# Patient Record
Sex: MALE | Race: WHITE | NOT HISPANIC OR LATINO | Employment: OTHER | URBAN - METROPOLITAN AREA
[De-identification: names, ages, dates, MRNs, and addresses within clinical notes are randomized per-mention and may not be internally consistent; named-entity substitution may affect disease eponyms.]

---

## 2017-07-25 ENCOUNTER — TELEPHONE (OUTPATIENT)
Dept: FAMILY MEDICINE | Facility: CLINIC | Age: 82
End: 2017-07-25

## 2017-07-25 NOTE — TELEPHONE ENCOUNTER
Voice mail message regarding follow up appointment for health issues. Also requested that he let us know if he has transferred his care elsewhere.

## 2020-03-03 ENCOUNTER — APPOINTMENT (OUTPATIENT)
Dept: CT IMAGING | Facility: CLINIC | Age: 85
End: 2020-03-03
Attending: EMERGENCY MEDICINE
Payer: COMMERCIAL

## 2020-03-03 ENCOUNTER — HOSPITAL ENCOUNTER (EMERGENCY)
Facility: CLINIC | Age: 85
Discharge: HOME | End: 2020-03-03
Attending: EMERGENCY MEDICINE | Admitting: EMERGENCY MEDICINE
Payer: COMMERCIAL

## 2020-03-03 VITALS
WEIGHT: 170 LBS | HEART RATE: 99 BPM | RESPIRATION RATE: 16 BRPM | SYSTOLIC BLOOD PRESSURE: 156 MMHG | TEMPERATURE: 98 F | BODY MASS INDEX: 31.09 KG/M2 | OXYGEN SATURATION: 96 % | DIASTOLIC BLOOD PRESSURE: 92 MMHG

## 2020-03-03 DIAGNOSIS — S60.417A ABRASION OF LEFT LITTLE FINGER, INITIAL ENCOUNTER: ICD-10-CM

## 2020-03-03 DIAGNOSIS — S09.93XA BLUNT TRAUMA OF FACE, INITIAL ENCOUNTER: ICD-10-CM
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
## 2020-03-03 DIAGNOSIS — S02.2XXA CLOSED FRACTURE OF NASAL BONE, INITIAL ENCOUNTER: ICD-10-CM

## 2020-03-03 DIAGNOSIS — S00.31XA NASAL ABRASION, INITIAL ENCOUNTER: ICD-10-CM

## 2020-03-03 PROCEDURE — 12011 RPR F/E/E/N/L/M 2.5 CM/<: CPT | Mod: 59 | Performed by: EMERGENCY MEDICINE

## 2020-03-03 PROCEDURE — 99284 EMERGENCY DEPT VISIT MOD MDM: CPT | Mod: 25 | Performed by: EMERGENCY MEDICINE

## 2020-03-03 PROCEDURE — 12001 RPR S/N/AX/GEN/TRNK 2.5CM/<: CPT | Mod: 59 | Performed by: EMERGENCY MEDICINE

## 2020-03-03 PROCEDURE — 70486 CT MAXILLOFACIAL W/O DYE: CPT

## 2020-03-03 PROCEDURE — 21310 ZZHC CLOSED TX NASAL BONE FRACTURE W/O MANIPULATION: CPT | Mod: 54 | Performed by: EMERGENCY MEDICINE

## 2020-03-03 PROCEDURE — 99285 EMERGENCY DEPT VISIT HI MDM: CPT | Mod: 25 | Performed by: EMERGENCY MEDICINE

## 2020-03-03 PROCEDURE — 21310 ZZHC CLOSED TX NASAL BONE FRACTURE W/O MANIPULATION: CPT | Performed by: EMERGENCY MEDICINE

## 2020-03-03 PROCEDURE — 70450 CT HEAD/BRAIN W/O DYE: CPT

## 2020-03-03 PROCEDURE — 72125 CT NECK SPINE W/O DYE: CPT

## 2020-03-03 RX ORDER — TAMSULOSIN HYDROCHLORIDE 0.4 MG/1
0.4 CAPSULE ORAL AT BEDTIME
COMMUNITY
End: 2022-01-01

## 2020-03-03 RX ORDER — ACETAMINOPHEN 325 MG/1
650 TABLET ORAL
Status: DISCONTINUED | OUTPATIENT
Start: 2020-03-03 | End: 2020-03-03 | Stop reason: HOSPADM

## 2020-03-03 ASSESSMENT — ENCOUNTER SYMPTOMS
NEUROLOGICAL NEGATIVE: 1
GASTROINTESTINAL NEGATIVE: 1
ENDOCRINE NEGATIVE: 1
CONSTITUTIONAL NEGATIVE: 1
CARDIOVASCULAR NEGATIVE: 1
HEMATOLOGIC/LYMPHATIC NEGATIVE: 1
MUSCULOSKELETAL NEGATIVE: 1
EYES NEGATIVE: 1
ALLERGIC/IMMUNOLOGIC NEGATIVE: 1
RESPIRATORY NEGATIVE: 1
PSYCHIATRIC NEGATIVE: 1

## 2020-03-03 NOTE — ED PROVIDER NOTES
History     Chief Complaint   Patient presents with     Head Injury     stepping off a step and fell. No LOC. No blood thinners. Nose injury, laceration to bilateral hands, and knee injury.      HPI  Anjel Thomas is a 84 year old male presents to the wound with report of facial trauma after fall from standing height.  He was at [employer] earlier today and reports he missed a step when he fell face first. arrived by car with his brother Don reporting some swelling about the nose pain about the midface.  He has no headache.  He reports no neck pain.  He reports no chest pain no abdominal pain no hip pain and no back pain.  Patient is currently on lisinopril reports he does not take any blood thinners., injury about an hour prior to arrival in the department.    Allergies:  Allergies   Allergen Reactions     Penicillins Other (See Comments)     Pt does not know.       Problem List:    Patient Active Problem List    Diagnosis Date Noted     Gout 08/12/2013     Priority: Medium     [employer] Update utility       Carpal tunnel syndrome of right wrist 04/12/2013     Priority: Medium     Osteoarthritis 03/09/2013     Priority: Medium     Inflammatory arthritis 03/06/2013     Priority: Medium     CARDIOVASCULAR SCREENING; LDL GOAL LESS THAN 130 02/12/2013     Priority: Medium        Past Medical History:    No past medical history on file.    Past Surgical History:    Past Surgical History:   Procedure Laterality Date     RELEASE CARPAL TUNNEL  5/10/2013    Procedure: RELEASE CARPAL TUNNEL;  Right Carpal Tunnel Release;  Surgeon: Cyril Ugarte MD;  Location: [facility] OR       Family History:    Family History   Problem Relation Age of Onset     Arthritis Mother      Cerebrovascular Disease Father      Respiratory Brother         COPD       Social History:  Marital Status:  Single [1]  Social History     Tobacco Use     Smoking status: Never Smoker     Smokeless tobacco: Never Used   Substance Use Topics     Alcohol use: No     Drug  use: No        Medications:    lisinopril (PRINIVIL,ZESTRIL) 10 MG tablet  Multiple Vitamin (ONE-A-DAY MENS PO)  tamsulosin (FLOMAX) 0.4 MG capsule          Review of Systems   Constitutional: Negative.         Fall from standing height   HENT:        Nasal swelling, facial trauma   Eyes: Negative.    Respiratory: Negative.    Cardiovascular: Negative.    Gastrointestinal: Negative.    Endocrine: Negative.    Genitourinary: Negative.    Musculoskeletal: Negative.    Skin: Negative.    Allergic/Immunologic: Negative.    Neurological: Negative.    Hematological: Negative.    Psychiatric/Behavioral: Negative.    All other systems reviewed and are negative.      Physical Exam   BP: (!) 156/92  Pulse: 99  Temp: 98  F (36.7  C)  Resp: 16  Weight: 77.1 kg (170 lb)  SpO2: 96 %      Physical Exam  Constitutional:       General: He is not in acute distress.     Appearance: He is not ill-appearing, toxic-appearing or diaphoretic.   HENT:      Head: Normocephalic. Abrasion and contusion present.        Nose: Congestion present.   Eyes:      General: No scleral icterus.        Right eye: No discharge.         Left eye: No discharge.      Extraocular Movements: Extraocular movements intact.      Pupils: Pupils are equal, round, and reactive to light.   Neck:      Musculoskeletal: Normal range of motion. No neck rigidity or muscular tenderness.      Vascular: No carotid bruit.   Cardiovascular:      Pulses: Normal pulses.   Pulmonary:      Effort: Pulmonary effort is normal. No respiratory distress.      Breath sounds: Normal breath sounds. No wheezing, rhonchi or rales.   Chest:      Chest wall: No tenderness.   Musculoskeletal:         General: No swelling, tenderness, deformity or signs of injury.      Right lower leg: No edema.      Left lower leg: No edema.   Lymphadenopathy:      Cervical: No cervical adenopathy.   Skin:     Capillary Refill: Capillary refill takes less than 2 seconds.      Coloration: Skin is not jaundiced  or pale.      Findings: No bruising, erythema, lesion or rash.   Neurological:      General: No focal deficit present.      Mental Status: He is alert and oriented to person, place, and time.   Psychiatric:         Mood and Affect: Mood normal.         Behavior: Behavior normal.         Thought Content: Thought content normal.         Judgment: Judgment normal.         ED Course        Procedures               Critical Care time:  none                ED medications:  Medications   acetaminophen (TYLENOL) tablet 650 mg (has no administration in time range)         ED vitals:  Vitals:    03/03/20 1305   BP: (!) 156/92   Pulse: 99   Resp: 16   Temp: 98  F (36.7  C)   TempSrc: Oral   SpO2: 96%   Weight: 77.1 kg (170 lb)     ED labs and imaging:  Results for orders placed or performed during the hospital encounter of 03/03/20 (from the past 24 hour(s))   Head CT w/o contrast    Narrative    CT OF THE HEAD WITHOUT CONTRAST 3/3/2020 2:18 PM     COMPARISON: None    HISTORY: Ground-level fall, blunt facial trauma. Evaluate for acute  intracranial process post blunt trauma.    TECHNIQUE: 5 mm thick axial CT images of the head were acquired  without IV contrast material.    FINDINGS: There is moderate diffuse cerebral volume loss. There are  subtle patchy areas of decreased density in the cerebral white matter  bilaterally that are consistent with sequela of chronic small vessel  ischemic disease.    The ventricles and basal cisterns are within normal limits in  configuration given the degree of cerebral volume loss. There is no  midline shift. There are no extra-axial fluid collections.    No intracranial hemorrhage, mass or recent infarct.    There is scattered polypoid mucosal thickening throughout the  visualized paranasal sinuses but no definite evidence for acute  sinusitis. There is no mastoiditis. There are no fractures of the  visualized bones.      Impression    IMPRESSION: Scattered mucosal thickening throughout the  paranasal  sinuses. Diffuse cerebral volume loss and cerebral white matter  changes consistent with chronic small vessel ischemic disease. No  evidence for acute intracranial pathology.      Radiation dose for this scan was reduced using automated exposure  control, adjustment of the mA and/or kV according to patient size, or  iterative reconstruction technique.   CT Facial Bones without Contrast    Narrative    CT OF THE FACE WITHOUT CONTRAST 3/3/2020 2:19 PM     COMPARISON: None    HISTORY: Ground-level fall, blunt facial trauma. Advanced age,  evaluate for acute facial bone fracture post blunt trauma.    TECHNIQUE: Helical thin-section axial CT images of the face were  acquired without contrast. Coronal reconstructions were created from  the axial source data.      Impression    IMPRESSION: There are minimally displaced bilateral nasal bone  fractures. No other facial bone fractures identified. There is  scattered opacification of all of the paranasal sinuses by fluid and  mucosal thickening as well as thickening and sclerosis of the walls of  the bilateral maxillary and bilateral frontal sinuses suggesting  chronic inflammation. The orbits and globes bilaterally are  unremarkable.      Radiation dose for this scan was reduced using automated exposure  control, adjustment of the mA and/or kV according to patient size, or  iterative reconstruction technique.   Cervical spine CT w/o contrast    Narrative    CT CERVICAL SPINE WITHOUT CONTRAST   3/3/2020 2:22 PM     HISTORY: Ground-level fall, blunt facial trauma.  Advanced age,  history of arthritis; evaluate for acute bony process post fall.      TECHNIQUE: Axial images of the cervical spine were obtained without  intravenous contrast. Multiplanar reformations were performed.  Radiation dose for this scan was reduced using automated exposure  control, adjustment of the mA and/or kV according to patient size, or  iterative reconstruction technique.     COMPARISON:  None.    FINDINGS: Sagittal images demonstrate minimal spondylolisthesis on a  degenerative basis at C3-C4 and some minimal mid cervical kyphosis.  Posterior alignment is otherwise normal. There is no evidence for  fracture. There is some arthritic fusion of the C2-C3 facet joints.  There is multilevel degenerative disc and facet disease with some  erosive arthropathy at the C3-C4 level on the left. There is no  evidence for any significant central canal stenosis. Paraspinal soft  tissues are unremarkable as visualized.      Impression    IMPRESSION: Degenerative changes. No evidence for fracture.    KENIA CHILDERS MD             Assessments & Plan (with Medical Decision Making)   Clinical impression: 84-year-old male who presented to the department after a ground-level fall.  Patient has posttraumatic swelling and bruising about the bridge of the nose.  He also has a traumatic nasal abrasion measuring about 2 cm that was glued with Dermabond.  He has minimally displaced bilateral nasal bone fracture.  He also suffered abrasion and laceration about the PIP of his left pinky.  He is discharged home with care for facial trauma with ice, topical wound care.  Patient reports losing his footing tripping and falling face forward while leaving [employer] an hour prior to arrival. Max has multi-medical diagnoses include history of gout, osteoarthritis and inflammatory arthritis.  He is not anticoagulated but does take a baby aspirin.  He is on lisinopril. He was seen upon arrival due to concern about mechanism of injury, advanced age and facial trauma.  Patient was alert oriented x3.  He reported the fall occurred about an hour prior to presents for care with his brother Don by car.  reported no headache.  He has no neck pain.  No scalp bruising or hematoma noted no chest bruising no abdominal pain no chest wall crepitus no hip pain or back pain.  GCS was 15 on arrival. Soft tissue swelling with some bleeding about the bridge of  the nose.  There is no septal hematoma appreciated although exam was limited due to degree of swelling.      ED Course and Plan:  Reviewed the medical record.  He was offered Tylenol for pain and discomfort ice was applied to the area of swelling about the bridge of the nose.  Head, facial and neck imaging obtained based on advanced age with face plant with fall from standing height. Head imaging, cervical spine imaging was negative for acute intracranial process post blunt trauma there was additional incidental findings described by the interpreting radiologist.  See detail  In the report above.  His wound was cleaned.  Explored to ensure there was no foreign body.  His nasal abrasion was Dermabond closed with topical antibiotic placed.  Repeat examination did not reveal any septal hematoma.  His finger laceration was also Dermabond closed he had an abrasion about the lateral aspect of the hyperthenar eminence of the left hand which was glued and covered. Non -adhesive compressive dressing placed.  Care for facial trauma nasal bone fracture reviewed with the patient and brother who expressed comfort, understanding and agreement of plan of care.        Disclaimer: This note consists of symbols derived from keyboarding, dictation and/or voice recognition software. As a result, there may be errors in the script that have gone undetected. Please consider this when interpreting information found in this chart.  I have reviewed the nursing notes.    I have reviewed the findings, diagnosis, plan and need for follow up with the patient.       New Prescriptions    No medications on file       Final diagnoses:   Fall, initial encounter - tripped at [facility] with face plant   Blunt trauma of face, initial encounter - tripped with ground level fall   Closed fracture of nasal bone, initial encounter - bilateral   Nasal abrasion, initial encounter - 2 cm vertical abrasion   Abrasion of left little finger, initial encounter - At  the PIP, 1 cm in length       3/3/2020   [facility] EMERGENCY DEPARTMENT     Christofer Rodriguez MD  03/03/20 1930

## 2020-03-03 NOTE — ED NOTES
Pt fell down 1 step onto cement at 1100-landed on hands and face-no loc and not on a blood thinner-lac bridge of nose with minimal bleeding and swelling-superficial knee abrasions-small superficial rt wrist abrasion -skin tear lateral aspect left hand and lac with no bleeding left 5th digit-areas cleansed  -pt alert and oriented x 4

## 2020-03-03 NOTE — ED AVS SNAPSHOT
[facility] Emergency Department  [address] [address]  Phone:  [phone]  Fax:  [phone]                                    Anjel Thomas   MRN: [id number]    Department:  [facility] Emergency Department   Date of Visit:  3/3/2020           After Visit Summary Signature Page    I have received my discharge instructions, and my questions have been answered. I have discussed any challenges I see with this plan with the nurse or doctor.    ..........................................................................................................................................  Patient/Patient Representative Signature      ..........................................................................................................................................  Patient Representative Print Name and Relationship to Patient    ..................................................               ................................................  Date                                   Time    ..........................................................................................................................................  Reviewed by Signature/Title    ...................................................              ..............................................  Date                                               Time          22EPIC Rev 08/18

## 2020-03-03 NOTE — DISCHARGE INSTRUCTIONS
1) Your fall has resulted in broken bones in your nose on both sides. Imaging did not reveal any additional findings in the head face and neck.    2) We discussed care for broken nose including icing your nose over the next several days to reduce swelling.  We have also discussed reasons to return for reevaluation including nosebleed that would not stop after half an hour of applying pressure.  We have also discussed expected changes about the eyes and face that may show up over the next few days.    3) you should apply topical antibiotics at least once daily over the next several days.  Change dressing at least every 2 days.    4) the gash about your nose was glued closed.  Do not take a shower for least 24 hours

## 2022-01-01 ENCOUNTER — TELEPHONE (OUTPATIENT)
Dept: GERIATRICS | Facility: CLINIC | Age: 87
End: 2022-01-01

## 2022-01-01 ENCOUNTER — NURSING HOME VISIT (OUTPATIENT)
Dept: GERIATRICS | Facility: CLINIC | Age: 87
End: 2022-01-01
Payer: OTHER MISCELLANEOUS

## 2022-01-01 ENCOUNTER — LAB REQUISITION (OUTPATIENT)
Dept: LAB | Facility: CLINIC | Age: 87
End: 2022-01-01
Payer: COMMERCIAL

## 2022-01-01 ENCOUNTER — NURSING HOME VISIT (OUTPATIENT)
Dept: GERIATRICS | Facility: CLINIC | Age: 87
End: 2022-01-01
Payer: MEDICARE

## 2022-01-01 ENCOUNTER — TRANSITIONAL CARE UNIT VISIT (OUTPATIENT)
Dept: GERIATRICS | Facility: CLINIC | Age: 87
End: 2022-01-01
Payer: COMMERCIAL

## 2022-01-01 ENCOUNTER — TELEPHONE (OUTPATIENT)
Dept: GERIATRICS | Facility: CLINIC | Age: 87
End: 2022-01-01
Payer: OTHER MISCELLANEOUS

## 2022-01-01 VITALS
WEIGHT: 156 LBS | BODY MASS INDEX: 28.71 KG/M2 | TEMPERATURE: 97.7 F | HEIGHT: 62 IN | HEART RATE: 89 BPM | SYSTOLIC BLOOD PRESSURE: 104 MMHG | RESPIRATION RATE: 16 BRPM | DIASTOLIC BLOOD PRESSURE: 61 MMHG | OXYGEN SATURATION: 93 %

## 2022-01-01 VITALS
HEART RATE: 92 BPM | TEMPERATURE: 97.2 F | RESPIRATION RATE: 18 BRPM | OXYGEN SATURATION: 93 % | BODY MASS INDEX: 29.63 KG/M2 | HEIGHT: 62 IN | WEIGHT: 161 LBS | DIASTOLIC BLOOD PRESSURE: 84 MMHG | SYSTOLIC BLOOD PRESSURE: 147 MMHG

## 2022-01-01 VITALS
HEIGHT: 62 IN | SYSTOLIC BLOOD PRESSURE: 118 MMHG | RESPIRATION RATE: 20 BRPM | OXYGEN SATURATION: 96 % | DIASTOLIC BLOOD PRESSURE: 64 MMHG | BODY MASS INDEX: 25.76 KG/M2 | TEMPERATURE: 97.5 F | WEIGHT: 140 LBS | HEART RATE: 80 BPM

## 2022-01-01 VITALS
TEMPERATURE: 97.8 F | BODY MASS INDEX: 25.4 KG/M2 | WEIGHT: 138 LBS | HEART RATE: 89 BPM | HEIGHT: 62 IN | RESPIRATION RATE: 16 BRPM | DIASTOLIC BLOOD PRESSURE: 61 MMHG | SYSTOLIC BLOOD PRESSURE: 104 MMHG | OXYGEN SATURATION: 92 %

## 2022-01-01 VITALS
HEIGHT: 62 IN | WEIGHT: 149 LBS | SYSTOLIC BLOOD PRESSURE: 131 MMHG | BODY MASS INDEX: 27.42 KG/M2 | TEMPERATURE: 97 F | RESPIRATION RATE: 18 BRPM | HEART RATE: 91 BPM | OXYGEN SATURATION: 94 % | DIASTOLIC BLOOD PRESSURE: 83 MMHG

## 2022-01-01 VITALS
BODY MASS INDEX: 29.63 KG/M2 | HEIGHT: 62 IN | HEART RATE: 88 BPM | OXYGEN SATURATION: 92 % | TEMPERATURE: 97.3 F | SYSTOLIC BLOOD PRESSURE: 120 MMHG | RESPIRATION RATE: 16 BRPM | WEIGHT: 161 LBS | DIASTOLIC BLOOD PRESSURE: 70 MMHG

## 2022-01-01 VITALS
DIASTOLIC BLOOD PRESSURE: 66 MMHG | HEIGHT: 62 IN | WEIGHT: 117 LBS | BODY MASS INDEX: 21.53 KG/M2 | TEMPERATURE: 97.2 F | OXYGEN SATURATION: 92 % | RESPIRATION RATE: 16 BRPM | HEART RATE: 75 BPM | SYSTOLIC BLOOD PRESSURE: 114 MMHG

## 2022-01-01 VITALS
SYSTOLIC BLOOD PRESSURE: 147 MMHG | HEART RATE: 92 BPM | BODY MASS INDEX: 29.63 KG/M2 | TEMPERATURE: 97.2 F | RESPIRATION RATE: 18 BRPM | OXYGEN SATURATION: 93 % | DIASTOLIC BLOOD PRESSURE: 84 MMHG | HEIGHT: 62 IN | WEIGHT: 161 LBS

## 2022-01-01 DIAGNOSIS — T88.7XXA MEDICATION SIDE EFFECTS: ICD-10-CM

## 2022-01-01 DIAGNOSIS — R09.02 HYPOXIA: Primary | ICD-10-CM

## 2022-01-01 DIAGNOSIS — R29.6 FALLS FREQUENTLY: ICD-10-CM

## 2022-01-01 DIAGNOSIS — Z79.899 MEDICATION DOSE INCREASED: Primary | ICD-10-CM

## 2022-01-01 DIAGNOSIS — R53.81 DECLINING FUNCTIONAL STATUS: ICD-10-CM

## 2022-01-01 DIAGNOSIS — F03.91 DEMENTIA WITH BEHAVIORAL DISTURBANCE, UNSPECIFIED DEMENTIA TYPE: ICD-10-CM

## 2022-01-01 DIAGNOSIS — I10 PRIMARY HYPERTENSION: ICD-10-CM

## 2022-01-01 DIAGNOSIS — D64.9 ANEMIA, UNSPECIFIED TYPE: ICD-10-CM

## 2022-01-01 DIAGNOSIS — N13.9 OBSTRUCTIVE UROPATHY: ICD-10-CM

## 2022-01-01 DIAGNOSIS — Z53.9 ERRONEOUS ENCOUNTER--DISREGARD: Primary | ICD-10-CM

## 2022-01-01 DIAGNOSIS — R31.0 GROSS HEMATURIA: ICD-10-CM

## 2022-01-01 DIAGNOSIS — Z51.5 HOSPICE CARE PATIENT: ICD-10-CM

## 2022-01-01 DIAGNOSIS — U07.1 COVID-19: ICD-10-CM

## 2022-01-01 DIAGNOSIS — J18.9 PNEUMONIA DUE TO INFECTIOUS ORGANISM, UNSPECIFIED LATERALITY, UNSPECIFIED PART OF LUNG: ICD-10-CM

## 2022-01-01 DIAGNOSIS — F03.91 DEMENTIA WITH BEHAVIORAL DISTURBANCE, UNSPECIFIED DEMENTIA TYPE: Primary | ICD-10-CM

## 2022-01-01 DIAGNOSIS — T33.90XS FROSTBITE, SEQUELA: ICD-10-CM

## 2022-01-01 DIAGNOSIS — F03.90 DEMENTIA WITHOUT BEHAVIORAL DISTURBANCE, UNSPECIFIED DEMENTIA TYPE: Primary | ICD-10-CM

## 2022-01-01 DIAGNOSIS — R09.02 HYPOXEMIA: ICD-10-CM

## 2022-01-01 DIAGNOSIS — D64.9 ANEMIA, UNSPECIFIED: ICD-10-CM

## 2022-01-01 DIAGNOSIS — N13.9 OBSTRUCTIVE UROPATHY: Primary | ICD-10-CM

## 2022-01-01 LAB
ANION GAP SERPL CALCULATED.3IONS-SCNC: 11 MMOL/L (ref 5–18)
BASOPHILS # BLD AUTO: 0 10E3/UL (ref 0–0.2)
BASOPHILS # BLD AUTO: 0 10E3/UL (ref 0–0.2)
BASOPHILS NFR BLD AUTO: 0 %
BASOPHILS NFR BLD AUTO: 0 %
BUN SERPL-MCNC: 26 MG/DL (ref 8–28)
CALCIUM SERPL-MCNC: 9.2 MG/DL (ref 8.5–10.5)
CHLORIDE BLD-SCNC: 109 MMOL/L (ref 98–107)
CO2 SERPL-SCNC: 23 MMOL/L (ref 22–31)
CREAT SERPL-MCNC: 1.12 MG/DL (ref 0.7–1.3)
EOSINOPHIL # BLD AUTO: 0.2 10E3/UL (ref 0–0.7)
EOSINOPHIL # BLD AUTO: 0.3 10E3/UL (ref 0–0.7)
EOSINOPHIL NFR BLD AUTO: 3 %
EOSINOPHIL NFR BLD AUTO: 3 %
ERYTHROCYTE [DISTWIDTH] IN BLOOD BY AUTOMATED COUNT: 13.7 % (ref 10–15)
ERYTHROCYTE [DISTWIDTH] IN BLOOD BY AUTOMATED COUNT: 14 % (ref 10–15)
GFR SERPL CREATININE-BSD FRML MDRD: 64 ML/MIN/1.73M2
GLUCOSE BLD-MCNC: 95 MG/DL (ref 70–125)
HCT VFR BLD AUTO: 38.8 % (ref 40–53)
HCT VFR BLD AUTO: 40 % (ref 40–53)
HGB BLD-MCNC: 12.5 G/DL (ref 13.3–17.7)
HGB BLD-MCNC: 13 G/DL (ref 13.3–17.7)
IMM GRANULOCYTES # BLD: 0 10E3/UL
IMM GRANULOCYTES # BLD: 0.1 10E3/UL
IMM GRANULOCYTES NFR BLD: 1 %
IMM GRANULOCYTES NFR BLD: 1 %
LYMPHOCYTES # BLD AUTO: 1 10E3/UL (ref 0.8–5.3)
LYMPHOCYTES # BLD AUTO: 1 10E3/UL (ref 0.8–5.3)
LYMPHOCYTES NFR BLD AUTO: 11 %
LYMPHOCYTES NFR BLD AUTO: 12 %
MAGNESIUM SERPL-MCNC: 1.9 MG/DL (ref 1.8–2.6)
MCH RBC QN AUTO: 29.1 PG (ref 26.5–33)
MCH RBC QN AUTO: 30.4 PG (ref 26.5–33)
MCHC RBC AUTO-ENTMCNC: 31.3 G/DL (ref 31.5–36.5)
MCHC RBC AUTO-ENTMCNC: 33.5 G/DL (ref 31.5–36.5)
MCV RBC AUTO: 91 FL (ref 78–100)
MCV RBC AUTO: 93 FL (ref 78–100)
MONOCYTES # BLD AUTO: 0.6 10E3/UL (ref 0–1.3)
MONOCYTES # BLD AUTO: 0.8 10E3/UL (ref 0–1.3)
MONOCYTES NFR BLD AUTO: 7 %
MONOCYTES NFR BLD AUTO: 9 %
NEUTROPHILS # BLD AUTO: 6.1 10E3/UL (ref 1.6–8.3)
NEUTROPHILS # BLD AUTO: 7.2 10E3/UL (ref 1.6–8.3)
NEUTROPHILS NFR BLD AUTO: 75 %
NEUTROPHILS NFR BLD AUTO: 78 %
NRBC # BLD AUTO: 0 10E3/UL
NRBC # BLD AUTO: 0 10E3/UL
NRBC BLD AUTO-RTO: 0 /100
NRBC BLD AUTO-RTO: 0 /100
PATH REPORT.COMMENTS IMP SPEC: NORMAL
PATH REPORT.COMMENTS IMP SPEC: NORMAL
PATH REPORT.FINAL DX SPEC: NORMAL
PATH REPORT.MICROSCOPIC SPEC OTHER STN: NORMAL
PATH REPORT.RELEVANT HX SPEC: NORMAL
PLATELET # BLD AUTO: 103 10E3/UL (ref 150–450)
PLATELET # BLD AUTO: 184 10E3/UL (ref 150–450)
POTASSIUM BLD-SCNC: 4 MMOL/L (ref 3.5–5)
RBC # BLD AUTO: 4.27 10E6/UL (ref 4.4–5.9)
RBC # BLD AUTO: 4.29 10E6/UL (ref 4.4–5.9)
RETICS # AUTO: 0.11 10E6/UL (ref 0.01–0.11)
RETICS/RBC NFR AUTO: 2.6 % (ref 0.8–2.7)
SARS-COV-2 RNA RESP QL NAA+PROBE: NEGATIVE
SARS-COV-2 RNA RESP QL NAA+PROBE: NEGATIVE
SODIUM SERPL-SCNC: 143 MMOL/L (ref 136–145)
WBC # BLD AUTO: 8.1 10E3/UL (ref 4–11)
WBC # BLD AUTO: 9.2 10E3/UL (ref 4–11)

## 2022-01-01 PROCEDURE — 36415 COLL VENOUS BLD VENIPUNCTURE: CPT | Performed by: FAMILY MEDICINE

## 2022-01-01 PROCEDURE — P9604 ONE-WAY ALLOW PRORATED TRIP: HCPCS | Performed by: FAMILY MEDICINE

## 2022-01-01 PROCEDURE — 83735 ASSAY OF MAGNESIUM: CPT | Mod: ORL | Performed by: FAMILY MEDICINE

## 2022-01-01 PROCEDURE — 85060 BLOOD SMEAR INTERPRETATION: CPT | Performed by: PATHOLOGY

## 2022-01-01 PROCEDURE — 85025 COMPLETE CBC W/AUTO DIFF WBC: CPT | Mod: ORL | Performed by: FAMILY MEDICINE

## 2022-01-01 PROCEDURE — 99309 SBSQ NF CARE MODERATE MDM 30: CPT | Performed by: NURSE PRACTITIONER

## 2022-01-01 PROCEDURE — P9604 ONE-WAY ALLOW PRORATED TRIP: HCPCS | Mod: ORL | Performed by: FAMILY MEDICINE

## 2022-01-01 PROCEDURE — 99309 SBSQ NF CARE MODERATE MDM 30: CPT | Mod: GV | Performed by: FAMILY MEDICINE

## 2022-01-01 PROCEDURE — 99306 1ST NF CARE HIGH MDM 50: CPT | Performed by: FAMILY MEDICINE

## 2022-01-01 PROCEDURE — U0005 INFEC AGEN DETEC AMPLI PROBE: HCPCS | Mod: ORL | Performed by: FAMILY MEDICINE

## 2022-01-01 PROCEDURE — 99309 SBSQ NF CARE MODERATE MDM 30: CPT | Performed by: FAMILY MEDICINE

## 2022-01-01 PROCEDURE — 99309 SBSQ NF CARE MODERATE MDM 30: CPT | Mod: GW | Performed by: NURSE PRACTITIONER

## 2022-01-01 PROCEDURE — U0003 INFECTIOUS AGENT DETECTION BY NUCLEIC ACID (DNA OR RNA); SEVERE ACUTE RESPIRATORY SYNDROME CORONAVIRUS 2 (SARS-COV-2) (CORONAVIRUS DISEASE [COVID-19]), AMPLIFIED PROBE TECHNIQUE, MAKING USE OF HIGH THROUGHPUT TECHNOLOGIES AS DESCRIBED BY CMS-2020-01-R: HCPCS | Mod: ORL | Performed by: FAMILY MEDICINE

## 2022-01-01 PROCEDURE — 85045 AUTOMATED RETICULOCYTE COUNT: CPT | Mod: ORL | Performed by: FAMILY MEDICINE

## 2022-01-01 PROCEDURE — 99310 SBSQ NF CARE HIGH MDM 45: CPT | Performed by: NURSE PRACTITIONER

## 2022-01-01 PROCEDURE — 80048 BASIC METABOLIC PNL TOTAL CA: CPT | Mod: ORL | Performed by: FAMILY MEDICINE

## 2022-01-01 PROCEDURE — 36415 COLL VENOUS BLD VENIPUNCTURE: CPT | Mod: ORL | Performed by: FAMILY MEDICINE

## 2022-01-01 RX ORDER — DOXYCYCLINE HYCLATE 100 MG
100 TABLET ORAL 2 TIMES DAILY
COMMUNITY
End: 2022-01-01

## 2022-01-01 RX ORDER — HYOSCYAMINE SULFATE 0.125 MG
0.12 TABLET ORAL EVERY 4 HOURS PRN
COMMUNITY

## 2022-01-01 RX ORDER — LANOLIN ALCOHOL/MO/W.PET/CERES
3 CREAM (GRAM) TOPICAL
COMMUNITY

## 2022-01-01 RX ORDER — MULTIVIT WITH MINERALS/LUTEIN
500 TABLET ORAL DAILY
COMMUNITY
End: 2022-01-01

## 2022-01-01 RX ORDER — FUROSEMIDE 20 MG
20 TABLET ORAL DAILY
COMMUNITY

## 2022-01-01 RX ORDER — CALAMINE
LOTION (ML) TOPICAL 2 TIMES DAILY
COMMUNITY

## 2022-01-01 RX ORDER — AMOXICILLIN 250 MG
1 CAPSULE ORAL 2 TIMES DAILY
COMMUNITY

## 2022-01-01 RX ORDER — POLYETHYLENE GLYCOL 3350 17 G/17G
1 POWDER, FOR SOLUTION ORAL DAILY
COMMUNITY

## 2022-01-01 RX ORDER — CEFPODOXIME PROXETIL 200 MG/1
200 TABLET, FILM COATED ORAL 2 TIMES DAILY
COMMUNITY
End: 2022-01-01

## 2022-01-01 RX ORDER — QUETIAPINE FUMARATE 25 MG/1
12.5 TABLET, FILM COATED ORAL 2 TIMES DAILY
COMMUNITY

## 2022-01-01 RX ORDER — ACETAMINOPHEN 500 MG
1000 TABLET ORAL 3 TIMES DAILY
COMMUNITY
End: 2022-01-01

## 2022-01-01 RX ORDER — ACETAMINOPHEN 325 MG/1
650 TABLET ORAL EVERY 6 HOURS PRN
COMMUNITY

## 2022-01-01 RX ORDER — MORPHINE SULFATE 30 MG/1
2.5 TABLET ORAL 2 TIMES DAILY
COMMUNITY

## 2022-01-01 ASSESSMENT — MIFFLIN-ST. JEOR: SCORE: 1294.54

## 2022-02-14 NOTE — LETTER
2/14/2022        RE: Anjel Thomas  [id number] Count includes the [facility] [address]        [facility]       Patient Anjel Thomas  MRN: [id number]        Reason for Visit   No chief complaint on file.      Code Status     {CODE STATUS:906012}    Assessment     Frost bite b/l hands  Dementia  HTN  BPH  Generalized weakness    Plan     Pt is admitted to TCU for strengthening and rehab.  Patient admitted with frostbite.  He had wounds on his hands and was admitted to the burn center.  Currently we will continue with his current wound cares and follow-up with the burn center as an outpatient    Recheck labs  Continue with PT/OT    History     Patient is a very pleasant 86 year old male who is admitted to TCU  Patient presented to the hospital after he was found wandering outside.  Core body temperature was 90 degrees.  He was shivering confused tachycardic.  Noted to have frostbite  Unfortunately has profound dementia due to which recall is limited and no clear-cut history could be obtained from the patient  He was obtained and burn center was involved in his care    Past Medical & Surgical History     PAST MEDICAL HISTORY: No past medical history on file.   PAST SURGICAL HISTORY:   has a past surgical history that includes Release carpal tunnel (5/10/2013).      Past Social History     Reviewed,  reports that he has never smoked. He has never used smokeless tobacco. He reports that he does not drink alcohol and does not use drugs.    Family History     Reviewed, and family history includes Arthritis in his mother; Cerebrovascular Disease in his father; Respiratory in his brother.    Medication List   Post Discharge Medication Reconciliation Status: Post Discharge Medication Reconciliation Status: {ACO Med Rec (Provider):659885}.  Current Outpatient Medications   Medication     lisinopril (PRINIVIL,ZESTRIL) 10 MG tablet     Multiple Vitamin (ONE-A-DAY MENS PO)     tamsulosin (FLOMAX) 0.4 MG capsule     No current  facility-administered medications for this visit.          Allergies     Allergies   Allergen Reactions     Penicillins Other (See Comments)     Pt does not know.       Review of Systems   A comprehensive review of 14 systems was done. Pertinent findings noted here and in history of present illness. All the rest negative.  Constitutional: Negative.  Negative for fever, chills, she has  activity change, appetite change and fatigue.   HENT: Negative for congestion and facial swelling.    Eyes: Negative for photophobia, redness and visual disturbance.   Respiratory: Negative for cough and chest tightness.    Cardiovascular: Negative for chest pain, palpitations and leg swelling.   Gastrointestinal: Negative for nausea, diarrhea, constipation, blood in stool and abdominal distention.   Genitourinary: Negative.    Musculoskeletal: Negative.    Skin: Negative.    Neurological: Negative for dizziness, tremors, syncope, weakness, light-headedness and headaches.   Hematological: Does not bruise/bleed easily.   Psychiatric/Behavioral: Negative.        Physical Exam   There were no vitals taken for this visit.     Constitutional: Oriented to person, place, and time and appears well-developed.   HEENT:  Normocephalic and atraumatic.  Eyes: Conjunctivae and EOM are normal. Pupils are equal, round, and reactive to light. No discharge.  No scleral icterus. Nose normal. Mouth/Throat: Oropharynx is clear and moist. No oropharyngeal exudate.    NECK: Normal range of motion. Neck supple. No JVD present. No tracheal deviation present. No thyromegaly present.   CARDIOVASCULAR: Normal rate, regular rhythm and intact distal pulses.  Exam reveals no gallop and no friction rub.  Systolic murmur present.  PULMONARY: Effort normal and breath sounds normal. No respiratory distress.No Wheezing or rales.  ABDOMEN: Soft. Bowel sounds are normal. No distension and no mass.  There is no tenderness. There is no rebound and no guarding. No  HSM.  MUSCULOSKELETAL: Normal range of motion. No edema and no tenderness. Mild kyphosis, no tenderness.  LYMPH NODES: Has no cervical, supraclavicular, axillary and groin adenopathy.   NEUROLOGICAL: Alert and oriented to person, place, and time. No cranial nerve deficit.  Normal muscle tone. Coordination normal.   GENITOURINARY: Deferred exam.  SKIN: Skin is warm and dry. No rash noted. No erythema. No pallor.   EXTREMITIES: No cyanosis, no clubbing, no edema. No Deformity.  PSYCHIATRIC: Normal mood, affect and behavior.      Lab Results     Recent Results (from the past 240 hour(s))   COVID-19 Virus (Coronavirus) by PCR Nasopharyngeal    Collection Time: 02/11/22  8:30 PM    Specimen: Nasopharyngeal; Swab   Result Value Ref Range    SARS CoV2 PCR Negative Negative, Testing sent to reference lab. Results will be returned via unsolicited result             Electronically signed by    Asha Ford MD                             Sincerely,        JCARLOS Hyde
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
## 2022-02-16 NOTE — PROGRESS NOTES
"Code Status:  FULL CODE  Visit Type: RECHECK     Facility:   [facility] ([facility]) [[id number]]        History of Present Illness:   Hospital Admission Date: 2/8/2022 Hospital Discharge Date: 2/11/2022  Hospital Admission Date: 2/13/2022 Hospital Discharge Date: 2/16/2022      Anjel Thomas is a 86 year old male with a past medical history for HTN, senile nuclear sclerosis, BPH and dementia.  He was hospitalized after being found outside wandering.  He was found to have frostbite to bilateral hands and was treated by the burn specialist at [facility].  He was transferred to TCU.  At the TCU, he had multiple falls and was sent back to the hospital due to unable to keep him safe.  At the hospital, he was found to have pneumonia and was treated with IVF and abx.  He had obstructive uropathy and walker catheter was placed.  He developed hematuria and hgb was stable in 12s.  UA was negative for UTI.  He was transferred back to LTC.     Today, he is sitting in a anthony-chair.  He does wake up to voice and states \"no\" when I ask if he has pain.  He has gross hematuria with very small clots in his walker bag. hgb today is good at 13.   Nursing reports he had no clots last night. His fosamax was changed to HS dosing.  Frostbite observed without dressing.  I did find the burn care orders in the chart.      History reviewed. No pertinent past medical history.  Past Surgical History:   Procedure Laterality Date     RELEASE CARPAL TUNNEL  5/10/2013    Procedure: RELEASE CARPAL TUNNEL;  Right Carpal Tunnel Release;  Surgeon: Cyril Ugarte MD;  Location: [facility] OR     Family History   Problem Relation Age of Onset     Arthritis Mother      Cerebrovascular Disease Father      Respiratory Brother         COPD     Social History     Socioeconomic History     Marital status: Single     Spouse name: Not on file     Number of children: Not on file     Years of education: Not on file     Highest education level: Not on file "   Occupational History     Not on file   Tobacco Use     Smoking status: Never Smoker     Smokeless tobacco: Never Used   Substance and Sexual Activity     Alcohol use: No     Drug use: No     Sexual activity: Not Currently   Other Topics Concern     Parent/sibling w/ CABG, MI or angioplasty before 65F 55M? No   Social History Narrative     Not on file     Social Determinants of Health     Financial Resource Strain: Not on file   Food Insecurity: Not on file   Transportation Needs: Not on file   Physical Activity: Not on file   Stress: Not on file   Social Connections: Not on file   Intimate Partner Violence: Not on file   Housing Stability: Not on file       Current Outpatient Medications   Medication Sig Dispense Refill     acetaminophen (TYLENOL) 500 MG tablet Take 1,000 mg by mouth 3 times daily       Calamine external lotion Apply topically 2 times daily       cefpodoxime (VANTIN) 200 MG tablet Take 200 mg by mouth 2 times daily x3d       doxycycline hyclate (VIBRA-TABS) 100 MG tablet Take 100 mg by mouth 2 times daily x3d       furosemide (LASIX) 20 MG tablet Take 20 mg by mouth daily       magnesium hydroxide (MILK OF MAGNESIA) 400 MG/5ML suspension Take 30 mLs by mouth daily as needed for constipation or heartburn       melatonin 3 MG tablet Take 3 mg by mouth nightly as needed for sleep       Multiple Vitamin (ONE-A-DAY MENS PO) Take 1 tablet by mouth daily       polyethylene glycol (MIRALAX) 17 g packet Take 1 packet by mouth daily       senna-docusate (SENOKOT-S/PERICOLACE) 8.6-50 MG tablet Take 1 tablet by mouth 2 times daily       tamsulosin (FLOMAX) 0.4 MG capsule Take 0.4 mg by mouth At Bedtime       vitamin C (ASCORBIC ACID) 250 MG tablet Take 500 mg by mouth daily       Allergies   Allergen Reactions     Amlodipine      Hydrochlorothiazide Unknown     Penicillins Other (See Comments)     Pt does not know.     Immunization History   Administered Date(s) Administered     COVID-19,PF,Moderna  "02/04/2021, 03/04/2021, 11/24/2021     Flu, Unspecified 02/04/2008, 10/13/2009, 04/05/2011, 01/01/2018, 10/14/2019, 09/23/2021     Influenza (H1N1) 04/01/2010     Influenza (High Dose) 3 valent vaccine 01/17/2018, 11/02/2018     Influenza (IIV3) PF 01/01/2018     Influenza Vaccine IM > 6 months Valent IIV4 (Alfuria,Fluzone) 09/23/2021     Influenza Vaccine Im 4yrs+ 4 Valent CCIIV4 10/14/2019     Influenza, Quad, High Dose, Pf, 65yr+ (Fluzone HD) 09/22/2020     Mantoux Tuberculin Skin Test 02/11/2022     Pneumo Conj 13-V (2010&after) 05/10/2016     Pneumococcal, Unspecified 02/04/2008     Td (Adult), Adsorbed 07/18/1997, 02/08/2022     Tdap (Adacel,Boostrix) 05/04/2015     Tdap (Adult) Unspecified Formulation 07/01/2001, 04/05/2011     Zoster vaccine recombinant adjuvanted (SHINGRIX) 09/22/2020, 04/27/2021     Zoster vaccine, live 04/30/2013       Medications list and allergies in the facility chart have been reviewed.  Please see facility EMR for most up to date list.       Review of Systems   Difficult to obtain due to dementia      Physical Exam   Vital signs:/70   Pulse 88   Temp 97.3  F (36.3  C)   Resp 16   Ht 1.575 m (5' 2\")   Wt 73 kg (161 lb)   SpO2 92%   BMI 29.45 kg/m     GENERAL APPEARANCE: frail, elderly male, in no acute distress.  HEENT: normocephalic, atraumatic   sclerae anicteric, conjunctivae clear and moist, EOM intact  LUNGS: Lung sounds CTA, no adventitious sounds, respiratory effort normal.  CARD: RRR, S1, S2, without murmurs, gallops, rubs  ABD: Soft, nondistended and nontender with normal bowel sounds. Nash catheter draining burgundy urine with small clots  MSK: Muscle strength and tone were equal bilaterally. Moves all extremities easily and intentionally.   EXTREMITIES: No cyanosis, clubbing or edema.  NEURO: Alert with cognitive impairment. Face is symmetric.  SKIN: dark dry areas on fingers bilateral hands  PSYCH: euthymic      Labs:    Lab Results   Component Value Date    " WBC 9.2 02/17/2022    WBC 10.4 04/30/2013     Lab Results   Component Value Date    RBC 4.27 02/17/2022    RBC 4.76 04/30/2013     Lab Results   Component Value Date    HGB 13.0 02/17/2022    HGB 13.6 04/30/2013     Lab Results   Component Value Date    HCT 38.8 02/17/2022    HCT 40.8 04/30/2013     Lab Results   Component Value Date    MCV 91 02/17/2022    MCV 85.7 04/30/2013     Lab Results   Component Value Date    MCH 30.4 02/17/2022    MCH 28.6 04/30/2013     Lab Results   Component Value Date    MCHC 33.5 02/17/2022    MCHC 33.3 04/30/2013     Lab Results   Component Value Date    RDW 14.0 02/17/2022    RDW 13.6 04/30/2013     Lab Results   Component Value Date     02/17/2022     04/30/2013     03/06/2013     Last Comprehensive Metabolic Panel:  Sodium   Date Value Ref Range Status   04/30/2013 139 mmol/L Final     Potassium   Date Value Ref Range Status   04/30/2013 4.3 mmol/L Final     Chloride   Date Value Ref Range Status   04/30/2013 104 mmol/L Final     Carbon Dioxide   Date Value Ref Range Status   03/06/2013 25 20 - 32 mmol/L Final     Anion Gap   Date Value Ref Range Status   03/06/2013 10 6 - 17 mmol/L Final     Glucose   Date Value Ref Range Status   04/30/2013 95 60 - 99 mg/dL Final     Urea Nitrogen   Date Value Ref Range Status   04/30/2013 17 mg/dL Final     Creatinine   Date Value Ref Range Status   04/30/2013 1.0 mg/dL Final     GFR Estimate   Date Value Ref Range Status   04/30/2013 >60 ml/min/1.73m2 Final     Calcium   Date Value Ref Range Status   04/30/2013 9.4 mg/dL Final       Assessment/plan:   Dementia with behavioral disturbance, unspecified dementia type (H)  Stable at this time, has seroquel prn. Monitor mood and impulsivity.     Pneumonia:  Breathing is good, will be finishing cefpodoxime and doxycycline in tomorrow.     Falls frequently  Will continue with therapy, needs supervision due to flight risk.     Primary hypertension  Stable on no  medications    Frostbite, sequela  Follow up with frostbite clinic on 2/28.  Wrote orders for xeroform and gauze daily.     Gross hematuria  Instructed nursing to irrigate and monitor.      Anemia, unspecified type  Stable, actually hgb better than when he was in the hospital.  Continue to monitor with hematuria.       Electronically signed by: Kenyetta Menendez NP

## 2022-02-17 NOTE — TELEPHONE ENCOUNTER
[facility] Triage Nurse Telephone Encounter    Provider: ALEXANDRIA Barriga  Facility: [facility] Facility Type:  TCU    Caller: Fely  Call Back Number: [phone]    Allergies:    Allergies   Allergen Reactions     Amlodipine      Hydrochlorothiazide Unknown     Penicillins Other (See Comments)     Pt does not know.        Reason for call: Nurse is calling to report Heme 1, Mg, and retic count.  The peripheral smear is pending.  Notable meds:  Vitamin C 500mg daily, Lasix 20mg daily, Tamsulosin 0.4mg Q HS, Cefpodoxime 200mg BID(ends on 2/19/22), Doxycycline 100mg BID(ends on 2/19/22).      Verbal Order/Direction given by Provider: No new orders.      Provider giving Order:  ALEXANDRIA Barriga    Verbal Order given to: Fely Lozada RN

## 2022-02-17 NOTE — LETTER
"    2/17/2022        RE: Anjel Thomas  [id number] [address] [id number]        Code Status:  FULL CODE  Visit Type: RECHECK     Facility:   [facility] ([facility] [[id number]]        History of Present Illness:   Hospital Admission Date: 2/8/2022 Hospital Discharge Date: 2/11/2022  Hospital Admission Date: 2/13/2022 Hospital Discharge Date: 2/16/2022      Anjel Thomas is a 86 year old male with a past medical history for HTN, senile nuclear sclerosis, BPH and dementia.  He was hospitalized after being found outside wandering.  He was found to have frostbite to bilateral hands and was treated by the burn specialist at [facility].  He was transferred to [facility].  At the TCU, he had multiple falls and was sent back to the hospital due to unable to keep him safe.  At the hospital, he was found to have pneumonia and was treated with IVF and abx.  He had obstructive uropathy and walker catheter was placed.  He developed hematuria and hgb was stable in 12s.  UA was negative for UTI.  He was transferred back to [facility].     Today, he is sitting in a anthony-chair.  He does wake up to voice and states \"no\" when I ask if he has pain.  He has gross hematuria with very small clots in his walker bag. hgb today is good at 13.   Nursing reports he had no clots last night. His fosamax was changed to HS dosing.  Frostbite observed without dressing.  I did find the burn care orders in the chart.      History reviewed. No pertinent past medical history.  Past Surgical History:   Procedure Laterality Date     RELEASE CARPAL TUNNEL  5/10/2013    Procedure: RELEASE CARPAL TUNNEL;  Right Carpal Tunnel Release;  Surgeon: Cyril Ugarte MD;  Location: [facility] OR     Family History   Problem Relation Age of Onset     Arthritis Mother      Cerebrovascular Disease Father      Respiratory Brother         COPD     Social History     Socioeconomic History     Marital status: Single     Spouse name: Not on file     Number of children: Not on file     " Years of education: Not on file     Highest education level: Not on file   Occupational History     Not on file   Tobacco Use     Smoking status: Never Smoker     Smokeless tobacco: Never Used   Substance and Sexual Activity     Alcohol use: No     Drug use: No     Sexual activity: Not Currently   Other Topics Concern     Parent/sibling w/ CABG, MI or angioplasty before 65F 55M? No   Social History Narrative     Not on file     Social Determinants of Health     Financial Resource Strain: Not on file   Food Insecurity: Not on file   Transportation Needs: Not on file   Physical Activity: Not on file   Stress: Not on file   Social Connections: Not on file   Intimate Partner Violence: Not on file   Housing Stability: Not on file       Current Outpatient Medications   Medication Sig Dispense Refill     acetaminophen (TYLENOL) 500 MG tablet Take 1,000 mg by mouth 3 times daily       Calamine external lotion Apply topically 2 times daily       cefpodoxime (VANTIN) 200 MG tablet Take 200 mg by mouth 2 times daily x3d       doxycycline hyclate (VIBRA-TABS) 100 MG tablet Take 100 mg by mouth 2 times daily x3d       furosemide (LASIX) 20 MG tablet Take 20 mg by mouth daily       magnesium hydroxide (MILK OF MAGNESIA) 400 MG/5ML suspension Take 30 mLs by mouth daily as needed for constipation or heartburn       melatonin 3 MG tablet Take 3 mg by mouth nightly as needed for sleep       Multiple Vitamin (ONE-A-DAY MENS PO) Take 1 tablet by mouth daily       polyethylene glycol (MIRALAX) 17 g packet Take 1 packet by mouth daily       senna-docusate (SENOKOT-S/PERICOLACE) 8.6-50 MG tablet Take 1 tablet by mouth 2 times daily       tamsulosin (FLOMAX) 0.4 MG capsule Take 0.4 mg by mouth At Bedtime       vitamin C (ASCORBIC ACID) 250 MG tablet Take 500 mg by mouth daily       Allergies   Allergen Reactions     Amlodipine      Hydrochlorothiazide Unknown     Penicillins Other (See Comments)     Pt does not know.     Immunization  "History   Administered Date(s) Administered     COVID-19,PF,Moderna 02/04/2021, 03/04/2021, 11/24/2021     Flu, Unspecified 02/04/2008, 10/13/2009, 04/05/2011, 01/01/2018, 10/14/2019, 09/23/2021     Influenza (H1N1) 04/01/2010     Influenza (High Dose) 3 valent vaccine 01/17/2018, 11/02/2018     Influenza (IIV3) PF 01/01/2018     Influenza Vaccine IM > 6 months Valent IIV4 (Alfuria,Fluzone) 09/23/2021     Influenza Vaccine Im 4yrs+ 4 Valent CCIIV4 10/14/2019     Influenza, Quad, High Dose, Pf, 65yr+ (Fluzone HD) 09/22/2020     Mantoux Tuberculin Skin Test 02/11/2022     Pneumo Conj 13-V (2010&after) 05/10/2016     Pneumococcal, Unspecified 02/04/2008     Td (Adult), Adsorbed 07/18/1997, 02/08/2022     Tdap (Adacel,Boostrix) 05/04/2015     Tdap (Adult) Unspecified Formulation 07/01/2001, 04/05/2011     Zoster vaccine recombinant adjuvanted (SHINGRIX) 09/22/2020, 04/27/2021     Zoster vaccine, live 04/30/2013       Medications list and allergies in the facility chart have been reviewed.  Please see facility EMR for most up to date list.       Review of Systems   Difficult to obtain due to dementia      Physical Exam   Vital signs:/70   Pulse 88   Temp 97.3  F (36.3  C)   Resp 16   Ht 1.575 m (5' 2\")   Wt 73 kg (161 lb)   SpO2 92%   BMI 29.45 kg/m     GENERAL APPEARANCE: frail, elderly male, in no acute distress.  HEENT: normocephalic, atraumatic   sclerae anicteric, conjunctivae clear and moist, EOM intact  LUNGS: Lung sounds CTA, no adventitious sounds, respiratory effort normal.  CARD: RRR, S1, S2, without murmurs, gallops, rubs  ABD: Soft, nondistended and nontender with normal bowel sounds. Nash catheter draining burgundy urine with small clots  MSK: Muscle strength and tone were equal bilaterally. Moves all extremities easily and intentionally.   EXTREMITIES: No cyanosis, clubbing or edema.  NEURO: Alert with cognitive impairment. Face is symmetric.  SKIN: dark dry areas on fingers bilateral " hands  PSYCH: euthymic      Labs:    Lab Results   Component Value Date    WBC 9.2 02/17/2022    WBC 10.4 04/30/2013     Lab Results   Component Value Date    RBC 4.27 02/17/2022    RBC 4.76 04/30/2013     Lab Results   Component Value Date    HGB 13.0 02/17/2022    HGB 13.6 04/30/2013     Lab Results   Component Value Date    HCT 38.8 02/17/2022    HCT 40.8 04/30/2013     Lab Results   Component Value Date    MCV 91 02/17/2022    MCV 85.7 04/30/2013     Lab Results   Component Value Date    MCH 30.4 02/17/2022    MCH 28.6 04/30/2013     Lab Results   Component Value Date    MCHC 33.5 02/17/2022    MCHC 33.3 04/30/2013     Lab Results   Component Value Date    RDW 14.0 02/17/2022    RDW 13.6 04/30/2013     Lab Results   Component Value Date     02/17/2022     04/30/2013     03/06/2013     Last Comprehensive Metabolic Panel:  Sodium   Date Value Ref Range Status   04/30/2013 139 mmol/L Final     Potassium   Date Value Ref Range Status   04/30/2013 4.3 mmol/L Final     Chloride   Date Value Ref Range Status   04/30/2013 104 mmol/L Final     Carbon Dioxide   Date Value Ref Range Status   03/06/2013 25 20 - 32 mmol/L Final     Anion Gap   Date Value Ref Range Status   03/06/2013 10 6 - 17 mmol/L Final     Glucose   Date Value Ref Range Status   04/30/2013 95 60 - 99 mg/dL Final     Urea Nitrogen   Date Value Ref Range Status   04/30/2013 17 mg/dL Final     Creatinine   Date Value Ref Range Status   04/30/2013 1.0 mg/dL Final     GFR Estimate   Date Value Ref Range Status   04/30/2013 >60 ml/min/1.73m2 Final     Calcium   Date Value Ref Range Status   04/30/2013 9.4 mg/dL Final       Assessment/plan:   Dementia with behavioral disturbance, unspecified dementia type (H)  Stable at this time, has seroquel prn. Monitor mood and impulsivity.     Pneumonia:  Breathing is good, will be finishing cefpodoxime and doxycycline in tomorrow.     Falls frequently  Will continue with therapy, needs supervision due  to flight risk.     Primary hypertension  Stable on no medications    Frostbite, sequela  Follow up with frostbite clinic on 2/28.  Wrote orders for xeroform and gauze daily.     Gross hematuria  Instructed nursing to irrigate and monitor.      Anemia, unspecified type  Stable, actually hgb better than when he was in the hospital.  Continue to monitor with hematuria.       Electronically signed by: Kenyetta Menendez NP          Sincerely,        Kenyetta Menendez, NP

## 2022-02-22 NOTE — LETTER
"    2/22/2022        RE: Anjel Thomas  [id number] [facility] [id number]        Code Status:  FULL CODE  Visit Type: RECHECK     Facility:   [facility] (formerly [facility] ([facility]) [[id number]]        History of Present Illness:   Hospital Admission Date: 2/8/2022 Hospital Discharge Date: 2/11/2022  Hospital Admission Date: 2/13/2022 Hospital Discharge Date: 2/16/2022      Anjel Thomas is a 86 year old male with a past medical history for HTN, senile nuclear sclerosis, BPH and dementia.  He was hospitalized after being found outside wandering.  He was found to have frostbite to bilateral hands and was treated by the burn specialist at [facility].  He was transferred to U.  At the TCU, he had multiple falls and was sent back to the hospital due to unable to keep him safe.  At the hospital, he was found to have pneumonia and was treated with IVF and abx.  He had obstructive uropathy and walker catheter was placed.  He developed hematuria and hgb was stable in 12s.  UA was negative for UTI.  He was transferred back to LTC.     Today, he is sitting in a anthony-chair and is more alert and anxious today.  He is talking about \"having to retire\" and \"who do I talk to about quitting\" .  He did need seroquel at night over the weekend due to agitation and crawling out of bed in which he had a fall out of bed.   Walker catheter draining yellow urine and passing pink clots.  Denies any abdominal pain or discomfort.  Nursing reports good oral intake.       Current Outpatient Medications   Medication Sig Dispense Refill     acetaminophen (TYLENOL) 325 MG tablet Take 650 mg by mouth every 6 hours as needed for mild pain       Calamine external lotion Apply topically 2 times daily       furosemide (LASIX) 20 MG tablet Take 20 mg by mouth daily       magnesium hydroxide (MILK OF MAGNESIA) 400 MG/5ML suspension Take 30 mLs by mouth daily as needed for constipation or heartburn       melatonin 3 MG tablet Take 3 mg by mouth nightly as needed for " sleep       Multiple Vitamin (ONE-A-DAY MENS PO) Take 1 tablet by mouth daily       polyethylene glycol (MIRALAX) 17 g packet Take 1 packet by mouth daily       QUEtiapine (SEROQUEL) 25 MG tablet Take 12.5 mg by mouth 2 times daily as needed       senna-docusate (SENOKOT-S/PERICOLACE) 8.6-50 MG tablet Take 1 tablet by mouth 2 times daily       tamsulosin (FLOMAX) 0.4 MG capsule Take 0.4 mg by mouth At Bedtime       vitamin C (ASCORBIC ACID) 250 MG tablet Take 500 mg by mouth daily       acetaminophen (TYLENOL) 500 MG tablet Take 1,000 mg by mouth 3 times daily (Patient not taking: Reported on 2/22/2022)       Allergies   Allergen Reactions     Amlodipine      Hydrochlorothiazide Unknown     Penicillins Other (See Comments)     Pt does not know.     Immunization History   Administered Date(s) Administered     COVID-19,PF,Moderna 02/04/2021, 03/04/2021, 11/24/2021     Flu, Unspecified 02/04/2008, 10/13/2009, 04/05/2011, 01/01/2018, 10/14/2019, 09/23/2021     Influenza (H1N1) 04/01/2010     Influenza (High Dose) 3 valent vaccine 01/17/2018, 11/02/2018     Influenza (IIV3) PF 01/01/2018     Influenza Vaccine IM > 6 months Valent IIV4 (Alfuria,Fluzone) 09/23/2021     Influenza Vaccine Im 4yrs+ 4 Valent CCIIV4 10/14/2019     Influenza, Quad, High Dose, Pf, 65yr+ (Fluzone HD) 09/22/2020     Mantoux Tuberculin Skin Test 02/11/2022     Pneumo Conj 13-V (2010&after) 05/10/2016     Pneumococcal, Unspecified 02/04/2008     Td (Adult), Adsorbed 07/18/1997, 02/08/2022     Tdap (Adacel,Boostrix) 05/04/2015     Tdap (Adult) Unspecified Formulation 07/01/2001, 04/05/2011     Zoster vaccine recombinant adjuvanted (SHINGRIX) 09/22/2020, 04/27/2021     Zoster vaccine, live 04/30/2013       Medications list and allergies in the facility chart have been reviewed.  Please see facility EMR for most up to date list.       Review of Systems   Difficult to obtain due to dementia      Physical Exam   Vital signs:/83   Pulse 91   Temp  "97  F (36.1  C)   Resp 18   Ht 1.575 m (5' 2\")   Wt 67.6 kg (149 lb)   SpO2 94%   BMI 27.25 kg/m     GENERAL APPEARANCE: frail, elderly male, in no acute distress.  HEENT: normocephalic, atraumatic   sclerae anicteric, conjunctivae clear and moist, EOM intact  LUNGS: Lung sounds CTA, no adventitious sounds, respiratory effort normal.  CARD: RRR, S1, S2, without murmurs, gallops, rubs  ABD: Soft, nondistended and nontender with normal bowel sounds. Nash catheter draining schwartz yellow urine with pink clots.   MSK: Muscle strength and tone were equal bilaterally. Weakness of LE however does have good ROM   EXTREMITIES: No cyanosis, clubbing or edema.  NEURO: Alert with cognitive impairment. Face is symmetric.  SKIN: dark dry areas on fingers bilateral hands  PSYCH: mild anxiety      Labs:    Lab Results   Component Value Date    WBC 9.2 02/17/2022    WBC 10.4 04/30/2013     Lab Results   Component Value Date    RBC 4.27 02/17/2022    RBC 4.76 04/30/2013     Lab Results   Component Value Date    HGB 13.0 02/17/2022    HGB 13.6 04/30/2013     Lab Results   Component Value Date    HCT 38.8 02/17/2022    HCT 40.8 04/30/2013     Lab Results   Component Value Date    MCV 91 02/17/2022    MCV 85.7 04/30/2013     Lab Results   Component Value Date    MCH 30.4 02/17/2022    MCH 28.6 04/30/2013     Lab Results   Component Value Date    MCHC 33.5 02/17/2022    MCHC 33.3 04/30/2013     Lab Results   Component Value Date    RDW 14.0 02/17/2022    RDW 13.6 04/30/2013     Lab Results   Component Value Date     02/17/2022     04/30/2013     03/06/2013     Last Comprehensive Metabolic Panel:  Sodium   Date Value Ref Range Status   04/30/2013 139 mmol/L Final     Potassium   Date Value Ref Range Status   04/30/2013 4.3 mmol/L Final     Chloride   Date Value Ref Range Status   04/30/2013 104 mmol/L Final     Carbon Dioxide   Date Value Ref Range Status   03/06/2013 25 20 - 32 mmol/L Final     Anion Gap   Date " Value Ref Range Status   03/06/2013 10 6 - 17 mmol/L Final     Glucose   Date Value Ref Range Status   04/30/2013 95 60 - 99 mg/dL Final     Urea Nitrogen   Date Value Ref Range Status   04/30/2013 17 mg/dL Final     Creatinine   Date Value Ref Range Status   04/30/2013 1.0 mg/dL Final     GFR Estimate   Date Value Ref Range Status   04/30/2013 >60 ml/min/1.73m2 Final     Calcium   Date Value Ref Range Status   04/30/2013 9.4 mg/dL Final         Assessment/plan:   Obstructive uropathy  Gross hematuria  Hematuria is improving, continue hydration and flushes as needed.  Follow up with urology.  Likely will need long term catheter    Dementia with behavioral disturbance, unspecified dementia type (H)  Poor rehab potential due to severe dementia.  Will need LTC placement. Needs close supervision.  Continue with prn seroquel for now.  May need antidepressant    Falls frequently  Has had 4 falls recently, due to impulsivity and dementia.  Needs close supervision     Frostbite, sequela  Follow up burn center 2/28, no wound cares being done due to patient taking off dressing.     Anemia, unspecified type  hgb good at 13.0 last week.     Dementia with behavioral disturbance, unspecified dementia type (H)  Stable at this time, has seroquel prn, may need to consider scheduled. Monitor mood and impulsivity.     Pneumonia:  Resolved and abx are completed.    Primary hypertension  Stable on no medications        Electronically signed by: Kenyetta Menendez NP          Sincerely,        Kenyetta Menendez NP

## 2022-02-22 NOTE — PROGRESS NOTES
"Code Status:  FULL CODE  Visit Type: RECHECK     Facility:   [facility] ([facility]) [[id number]]        History of Present Illness:   Hospital Admission Date: 2/8/2022 Hospital Discharge Date: 2/11/2022  Hospital Admission Date: 2/13/2022 Hospital Discharge Date: 2/16/2022      Anjel Thomas is a 86 year old male with a past medical history for HTN, senile nuclear sclerosis, BPH and dementia.  He was hospitalized after being found outside wandering.  He was found to have frostbite to bilateral hands and was treated by the burn specialist at [facility].  He was transferred to TCU.  At the TCU, he had multiple falls and was sent back to the hospital due to unable to keep him safe.  At the hospital, he was found to have pneumonia and was treated with IVF and abx.  He had obstructive uropathy and walker catheter was placed.  He developed hematuria and hgb was stable in 12s.  UA was negative for UTI.  He was transferred back to LTC.     Today, he is sitting in a anthony-chair and is more alert and anxious today.  He is talking about \"having to retire\" and \"who do I talk to about quitting\" .  He did need seroquel at night over the weekend due to agitation and crawling out of bed in which he had a fall out of bed.   Walker catheter draining yellow urine and passing pink clots.  Denies any abdominal pain or discomfort.  Nursing reports good oral intake.       Current Outpatient Medications   Medication Sig Dispense Refill     acetaminophen (TYLENOL) 325 MG tablet Take 650 mg by mouth every 6 hours as needed for mild pain       Calamine external lotion Apply topically 2 times daily       furosemide (LASIX) 20 MG tablet Take 20 mg by mouth daily       magnesium hydroxide (MILK OF MAGNESIA) 400 MG/5ML suspension Take 30 mLs by mouth daily as needed for constipation or heartburn       melatonin 3 MG tablet Take 3 mg by mouth nightly as needed for sleep       Multiple Vitamin (ONE-A-DAY MENS PO) Take 1 tablet by mouth daily       " "polyethylene glycol (MIRALAX) 17 g packet Take 1 packet by mouth daily       QUEtiapine (SEROQUEL) 25 MG tablet Take 12.5 mg by mouth 2 times daily as needed       senna-docusate (SENOKOT-S/PERICOLACE) 8.6-50 MG tablet Take 1 tablet by mouth 2 times daily       tamsulosin (FLOMAX) 0.4 MG capsule Take 0.4 mg by mouth At Bedtime       vitamin C (ASCORBIC ACID) 250 MG tablet Take 500 mg by mouth daily       acetaminophen (TYLENOL) 500 MG tablet Take 1,000 mg by mouth 3 times daily (Patient not taking: Reported on 2/22/2022)       Allergies   Allergen Reactions     Amlodipine      Hydrochlorothiazide Unknown     Penicillins Other (See Comments)     Pt does not know.     Immunization History   Administered Date(s) Administered     COVID-19,PF,Moderna 02/04/2021, 03/04/2021, 11/24/2021     Flu, Unspecified 02/04/2008, 10/13/2009, 04/05/2011, 01/01/2018, 10/14/2019, 09/23/2021     Influenza (H1N1) 04/01/2010     Influenza (High Dose) 3 valent vaccine 01/17/2018, 11/02/2018     Influenza (IIV3) PF 01/01/2018     Influenza Vaccine IM > 6 months Valent IIV4 (Alfuria,Fluzone) 09/23/2021     Influenza Vaccine Im 4yrs+ 4 Valent CCIIV4 10/14/2019     Influenza, Quad, High Dose, Pf, 65yr+ (Fluzone HD) 09/22/2020     Mantoux Tuberculin Skin Test 02/11/2022     Pneumo Conj 13-V (2010&after) 05/10/2016     Pneumococcal, Unspecified 02/04/2008     Td (Adult), Adsorbed 07/18/1997, 02/08/2022     Tdap (Adacel,Boostrix) 05/04/2015     Tdap (Adult) Unspecified Formulation 07/01/2001, 04/05/2011     Zoster vaccine recombinant adjuvanted (SHINGRIX) 09/22/2020, 04/27/2021     Zoster vaccine, live 04/30/2013       Medications list and allergies in the facility chart have been reviewed.  Please see facility EMR for most up to date list.       Review of Systems   Difficult to obtain due to dementia      Physical Exam   Vital signs:/83   Pulse 91   Temp 97  F (36.1  C)   Resp 18   Ht 1.575 m (5' 2\")   Wt 67.6 kg (149 lb)   SpO2 94%  "  BMI 27.25 kg/m     GENERAL APPEARANCE: frail, elderly male, in no acute distress.  HEENT: normocephalic, atraumatic   sclerae anicteric, conjunctivae clear and moist, EOM intact  LUNGS: Lung sounds CTA, no adventitious sounds, respiratory effort normal.  CARD: RRR, S1, S2, without murmurs, gallops, rubs  ABD: Soft, nondistended and nontender with normal bowel sounds. Nash catheter draining schwartz yellow urine with pink clots.   MSK: Muscle strength and tone were equal bilaterally. Weakness of LE however does have good ROM   EXTREMITIES: No cyanosis, clubbing or edema.  NEURO: Alert with cognitive impairment. Face is symmetric.  SKIN: dark dry areas on fingers bilateral hands  PSYCH: mild anxiety      Labs:    Lab Results   Component Value Date    WBC 9.2 02/17/2022    WBC 10.4 04/30/2013     Lab Results   Component Value Date    RBC 4.27 02/17/2022    RBC 4.76 04/30/2013     Lab Results   Component Value Date    HGB 13.0 02/17/2022    HGB 13.6 04/30/2013     Lab Results   Component Value Date    HCT 38.8 02/17/2022    HCT 40.8 04/30/2013     Lab Results   Component Value Date    MCV 91 02/17/2022    MCV 85.7 04/30/2013     Lab Results   Component Value Date    MCH 30.4 02/17/2022    MCH 28.6 04/30/2013     Lab Results   Component Value Date    MCHC 33.5 02/17/2022    MCHC 33.3 04/30/2013     Lab Results   Component Value Date    RDW 14.0 02/17/2022    RDW 13.6 04/30/2013     Lab Results   Component Value Date     02/17/2022     04/30/2013     03/06/2013     Last Comprehensive Metabolic Panel:  Sodium   Date Value Ref Range Status   04/30/2013 139 mmol/L Final     Potassium   Date Value Ref Range Status   04/30/2013 4.3 mmol/L Final     Chloride   Date Value Ref Range Status   04/30/2013 104 mmol/L Final     Carbon Dioxide   Date Value Ref Range Status   03/06/2013 25 20 - 32 mmol/L Final     Anion Gap   Date Value Ref Range Status   03/06/2013 10 6 - 17 mmol/L Final     Glucose   Date Value  Ref Range Status   04/30/2013 95 60 - 99 mg/dL Final     Urea Nitrogen   Date Value Ref Range Status   04/30/2013 17 mg/dL Final     Creatinine   Date Value Ref Range Status   04/30/2013 1.0 mg/dL Final     GFR Estimate   Date Value Ref Range Status   04/30/2013 >60 ml/min/1.73m2 Final     Calcium   Date Value Ref Range Status   04/30/2013 9.4 mg/dL Final         Assessment/plan:   Obstructive uropathy  Gross hematuria  Hematuria is improving, continue hydration and flushes as needed.  Follow up with urology.  Likely will need long term catheter    Dementia with behavioral disturbance, unspecified dementia type (H)  Poor rehab potential due to severe dementia.  Will need LTC placement. Needs close supervision.  Continue with prn seroquel for now.  May need antidepressant    Falls frequently  Has had 4 falls recently, due to impulsivity and dementia.  Needs close supervision     Frostbite, sequela  Follow up burn center 2/28, no wound cares being done due to patient taking off dressing.     Anemia, unspecified type  hgb good at 13.0 last week.     Dementia with behavioral disturbance, unspecified dementia type (H)  Stable at this time, has seroquel prn, may need to consider scheduled. Monitor mood and impulsivity.     Pneumonia:  Resolved and abx are completed.    Primary hypertension  Stable on no medications        Electronically signed by: Kenyetta Menendez NP

## 2022-02-28 NOTE — LETTER
2/28/2022        RE: Anjel Thomas  [id number] [facility] [id number]        [facility]       Patient Anjel Thomas  MRN: [id number]        Reason for Visit     Chief Complaint   Patient presents with     RECHECK   follow-up low BP /falls    Code Status     CPR/Full code     Assessment     Multiple falls with 5 falls reported in 2 days resulting in rehospitalization  Advanced dementia  Failure to thrive  Urinary retention requiring Nash catheter with hematuria noted today  Pulmonary hypertension  Hypotension  Recent history of frostbite of the right hand and left lower extremity  Generalized weakness    Plan     Pt is admitted to TCU for strengthening and rehab.  Admitted for therapy but unfortunately due to cognitive impairment and profound weakness he has not been able to participate much.  He has plateaued in therapy and will be moving to long-term care soon  Continues to fall almost daily  Has been moved to a OnBeep chair  Recently seen by urology and given a voiding trial.  Subsequently could not urinate in the TCU and sent to the emergency room to have a Nash catheter replaced  Hematuria has resolved  Frostbite wounds in his fingers are stable; no secondary infection noted  Continue with his current care plan  Blood pressures are low he is no longer on lisinopril.        History     Patient is a very pleasant 86 year old male who is admitted to TCU  Patient was initially admitted after he was found wandering and out in the snow with hypotension.  He was admitted noted to have frostbite which are being managed conservatively.  He unfortunately is confused and unable to tell me anything.  Fingers are still black however no secondary infection noted  He was admitted to the TCU was sent back after he fell 5 times in 2 days.  Today he is back he remains quite confused.  He has continued to fall in the TCU.  He has been moved to a Broda chair  Staff reports that he gets agitated and anxious in  the evening requiring Seroquel.    He falls when he is tries to self transfer  He denies any pain.  Blood pressures are low he is no longer on lisinopril.  Saw urology for obstructive uropathy and had a voiding trial.  Sent to the emergency room because of inability to void    Past Medical & Surgical History     PAST MEDICAL HISTORY: Dementia  Osteoarthritis hypertension  PAST SURGICAL HISTORY:   has a past surgical history that includes Release carpal tunnel (5/10/2013).      Past Social History     Reviewed,  reports that he has never smoked. He has never used smokeless tobacco. He reports that he does not drink alcohol and does not use drugs.    Family History     Reviewed, and family history includes Arthritis in his mother; Cerebrovascular Disease in his father; Respiratory in his brother.    Medication List   Post Discharge Medication Reconciliation Status: Post Discharge Medication Reconciliation Status: discharge medications reconciled, continue medications without change.  Current Outpatient Medications   Medication     acetaminophen (TYLENOL) 325 MG tablet     Calamine external lotion     furosemide (LASIX) 20 MG tablet     magnesium hydroxide (MILK OF MAGNESIA) 400 MG/5ML suspension     melatonin 3 MG tablet     Multiple Vitamin (ONE-A-DAY MENS PO)     polyethylene glycol (MIRALAX) 17 g packet     QUEtiapine (SEROQUEL) 25 MG tablet     senna-docusate (SENOKOT-S/PERICOLACE) 8.6-50 MG tablet     tamsulosin (FLOMAX) 0.4 MG capsule     vitamin C (ASCORBIC ACID) 250 MG tablet     No current facility-administered medications for this visit.    on doxy and cefopodoxime for 3d      Allergies     Allergies   Allergen Reactions     Amlodipine      Hydrochlorothiazide Unknown     Penicillins Other (See Comments)     Pt does not know.       Review of Systems   A comprehensive review of 14 systems was done. Pertinent findings noted here and in history of present illness. All the rest negative.  Constitutional:  "Negative.  Negative for fever, chills, he has  activity change, appetite change and fatigue.   HENT: Negative for congestion and facial swelling.    Eyes: Negative for photophobia, redness and visual disturbance.   Respiratory: Negative for cough and chest tightness.    Cardiovascular: Negative for chest pain, palpitations and has some leg swelling.   Gastrointestinal: Negative for nausea, diarrhea, constipation, blood in stool and abdominal distention.   Staff reporting incontinence  Genitourinary: Has an indwelling Nash catheter  No hematuria noted  Musculoskeletal: Negative.    Has been moved to a Broda chair.  Almost falling daily  Skin: Has frostbite related injuries noted on his right digits; also similar shallow injuries noted on his left calf  Neurological: Negative for dizziness, tremors, syncope, weakness, light-headedness and headaches.   Hematological: Does not bruise/bleed easily.   Psychiatric/Behavioral: Negative.  Pleasantly confused  Has no recall of recent events.  He however continues to get agitated especially sundowning in the evening requiring psych meds      Physical Exam   /61   Pulse 89   Temp 97.7  F (36.5  C)   Resp 16   Ht 1.575 m (5' 2\")   Wt 70.8 kg (156 lb)   SpO2 93%   BMI 28.53 kg/m       Constitutional: Oriented to person,and appears well-developed.   HEENT:  Normocephalic and atraumatic.  Eyes: Conjunctivae and EOM are normal. Pupils are equal, round, and reactive to light. No discharge.  No scleral icterus. Nose normal. Mouth/Throat: Oropharynx is clear and moist. No oropharyngeal exudate.    NECK: Normal range of motion. Neck supple. No JVD present. No tracheal deviation present. No thyromegaly present.   CARDIOVASCULAR: Normal rate, regular rhythm and intact distal pulses.  Exam reveals no gallop and no friction rub.  Systolic murmur present.  PULMONARY: Effort normal and breath sounds normal. No respiratory distress.No Wheezing or rales.  ABDOMEN: Soft. Bowel " sounds are normal. No distension and no mass.  There is no tenderness. There is no rebound and no guarding. No HSM.  MUSCULOSKELETAL: Normal range of motion. No edema and no tenderness. Mild kyphosis, no tenderness.  LYMPH NODES: Has no cervical, supraclavicular, axillary and groin adenopathy.   NEUROLOGICAL: Alert and oriented to person,  No cranial nerve deficit.  Normal muscle tone. Coordination normal.   GENITOURINARY: Indwelling Nash   SKIN: Skin is warm and dry. No rash noted. No erythema. No pallor.   Frost bite injuries affecting distal aspect of his digits of the right hand with some eschar formation and dry gangrene formation noted  Shallow lesions noted also on the left calf  EXTREMITIES: No cyanosis, no clubbing, no edema. No Deformity.  PSYCHIATRIC: Normal mood, affect and behavior.  Has minimal recall and very confused at best is alert only to self      Lab Results     Recent Results (from the past 240 hour(s))   COVID-19 Virus (Coronavirus) by PCR Nasopharyngeal    Collection Time: 02/18/22  7:30 PM    Specimen: Nasopharyngeal; Swab   Result Value Ref Range    SARS CoV2 PCR Negative Negative, Testing sent to reference lab. Results will be returned via unsolicited result             Electronically signed by    Asha Ford MD                             Sincerely,        JCARLOS Hyde

## 2022-02-28 NOTE — PROGRESS NOTES
[facility] GERIATRIC SERVICES       Patient Anjel Thomas  MRN: [id number]        Reason for Visit     Chief Complaint   Patient presents with     RECHECK   follow-up low BP /falls    Code Status     CPR/Full code     Assessment     Multiple falls with 5 falls reported in 2 days resulting in rehospitalization  Advanced dementia  Failure to thrive  Urinary retention requiring Nash catheter with hematuria noted today  Pulmonary hypertension  Hypotension  Recent history of frostbite of the right hand and left lower extremity  Generalized weakness    Plan     Pt is admitted to TCU for strengthening and rehab.  Admitted for therapy but unfortunately due to cognitive impairment and profound weakness he has not been able to participate much.  He has plateaued in therapy and will be moving to long-term care soon  Continues to fall almost daily  Has been moved to a Broda chair  Recently seen by urology and given a voiding trial.  Subsequently could not urinate in the TCU and sent to the emergency room to have a Nash catheter replaced  Hematuria has resolved  Frostbite wounds in his fingers are stable; no secondary infection noted  Continue with his current care plan  Blood pressures are low he is no longer on lisinopril.        History     Patient is a very pleasant 86 year old male who is admitted to TCU  Patient was initially admitted after he was found wandering and out in the snow with hypotension.  He was admitted noted to have frostbite which are being managed conservatively.  He unfortunately is confused and unable to tell me anything.  Fingers are still black however no secondary infection noted  He was admitted to the TCU was sent back after he fell 5 times in 2 days.  Today he is back he remains quite confused.  He has continued to fall in the TCU.  He has been moved to a Broda chair  Staff reports that he gets agitated and anxious in the evening requiring Seroquel.    He falls when he is tries to self transfer  He  denies any pain.  Blood pressures are low he is no longer on lisinopril.  Saw urology for obstructive uropathy and had a voiding trial.  Sent to the emergency room because of inability to void    Past Medical & Surgical History     PAST MEDICAL HISTORY: Dementia  Osteoarthritis hypertension  PAST SURGICAL HISTORY:   has a past surgical history that includes Release carpal tunnel (5/10/2013).      Past Social History     Reviewed,  reports that he has never smoked. He has never used smokeless tobacco. He reports that he does not drink alcohol and does not use drugs.    Family History     Reviewed, and family history includes Arthritis in his mother; Cerebrovascular Disease in his father; Respiratory in his brother.    Medication List   Post Discharge Medication Reconciliation Status: Post Discharge Medication Reconciliation Status: discharge medications reconciled, continue medications without change.  Current Outpatient Medications   Medication     acetaminophen (TYLENOL) 325 MG tablet     Calamine external lotion     furosemide (LASIX) 20 MG tablet     magnesium hydroxide (MILK OF MAGNESIA) 400 MG/5ML suspension     melatonin 3 MG tablet     Multiple Vitamin (ONE-A-DAY MENS PO)     polyethylene glycol (MIRALAX) 17 g packet     QUEtiapine (SEROQUEL) 25 MG tablet     senna-docusate (SENOKOT-S/PERICOLACE) 8.6-50 MG tablet     tamsulosin (FLOMAX) 0.4 MG capsule     vitamin C (ASCORBIC ACID) 250 MG tablet     No current facility-administered medications for this visit.    on doxy and cefopodoxime for 3d      Allergies     Allergies   Allergen Reactions     Amlodipine      Hydrochlorothiazide Unknown     Penicillins Other (See Comments)     Pt does not know.       Review of Systems   A comprehensive review of 14 systems was done. Pertinent findings noted here and in history of present illness. All the rest negative.  Constitutional: Negative.  Negative for fever, chills, he has  activity change, appetite change and  "fatigue.   HENT: Negative for congestion and facial swelling.    Eyes: Negative for photophobia, redness and visual disturbance.   Respiratory: Negative for cough and chest tightness.    Cardiovascular: Negative for chest pain, palpitations and has some leg swelling.   Gastrointestinal: Negative for nausea, diarrhea, constipation, blood in stool and abdominal distention.   Staff reporting incontinence  Genitourinary: Has an indwelling Nash catheter  No hematuria noted  Musculoskeletal: Negative.    Has been moved to a Broda chair.  Almost falling daily  Skin: Has frostbite related injuries noted on his right digits; also similar shallow injuries noted on his left calf  Neurological: Negative for dizziness, tremors, syncope, weakness, light-headedness and headaches.   Hematological: Does not bruise/bleed easily.   Psychiatric/Behavioral: Negative.  Pleasantly confused  Has no recall of recent events.  He however continues to get agitated especially sundowning in the evening requiring psych meds      Physical Exam   /61   Pulse 89   Temp 97.7  F (36.5  C)   Resp 16   Ht 1.575 m (5' 2\")   Wt 70.8 kg (156 lb)   SpO2 93%   BMI 28.53 kg/m       Constitutional: Oriented to person,and appears well-developed.   HEENT:  Normocephalic and atraumatic.  Eyes: Conjunctivae and EOM are normal. Pupils are equal, round, and reactive to light. No discharge.  No scleral icterus. Nose normal. Mouth/Throat: Oropharynx is clear and moist. No oropharyngeal exudate.    NECK: Normal range of motion. Neck supple. No JVD present. No tracheal deviation present. No thyromegaly present.   CARDIOVASCULAR: Normal rate, regular rhythm and intact distal pulses.  Exam reveals no gallop and no friction rub.  Systolic murmur present.  PULMONARY: Effort normal and breath sounds normal. No respiratory distress.No Wheezing or rales.  ABDOMEN: Soft. Bowel sounds are normal. No distension and no mass.  There is no tenderness. There is no " rebound and no guarding. No HSM.  MUSCULOSKELETAL: Normal range of motion. No edema and no tenderness. Mild kyphosis, no tenderness.  LYMPH NODES: Has no cervical, supraclavicular, axillary and groin adenopathy.   NEUROLOGICAL: Alert and oriented to person,  No cranial nerve deficit.  Normal muscle tone. Coordination normal.   GENITOURINARY: Indwelling Nash   SKIN: Skin is warm and dry. No rash noted. No erythema. No pallor.   Frost bite injuries affecting distal aspect of his digits of the right hand with some eschar formation and dry gangrene formation noted  Shallow lesions noted also on the left calf  EXTREMITIES: No cyanosis, no clubbing, no edema. No Deformity.  PSYCHIATRIC: Normal mood, affect and behavior.  Has minimal recall and very confused at best is alert only to self      Lab Results     Recent Results (from the past 240 hour(s))   COVID-19 Virus (Coronavirus) by PCR Nasopharyngeal    Collection Time: 02/18/22  7:30 PM    Specimen: Nasopharyngeal; Swab   Result Value Ref Range    SARS CoV2 PCR Negative Negative, Testing sent to reference lab. Results will be returned via unsolicited result             Electronically signed by    Asha Ford MD

## 2022-03-01 NOTE — PROGRESS NOTES
Code Status:  FULL CODE  Visit Type: RECHECK     Facility:   [facility] ([facility]) [[id number]]        History of Present Illness:   Hospital Admission Date: 2/8/2022 Hospital Discharge Date: 2/11/2022  Hospital Admission Date: 2/13/2022 Hospital Discharge Date: 2/16/2022      Anjel Thomas is a 86 year old male with a past medical history for HTN, senile nuclear sclerosis, BPH and dementia.  He was hospitalized after being found outside wandering.  He was found to have frostbite to bilateral hands and was treated by the burn specialist at [facility].  He was transferred to TCU.  At the TCU, he had multiple falls and was sent back to the hospital due to unable to keep him safe.  At the hospital, he was found to have pneumonia and was treated with IVF and abx.  He had obstructive uropathy and walker catheter was placed.  He developed hematuria and hgb was stable in 12s.  UA was negative for UTI.  He was transferred back to LTC.     Today, nursing requests a visit due to new onset hypoxia and now on 2L of O2.  Nursing reports that last night O2 sats was below 90%.  Upon exam he is more weak appearing and slumped over more in his anthony-chair.  LS are difficult to assess due to does not follow commands to deep inspire.  Catheter had to be reinserted last week at the ER due to obstruction and retention.  Has lots of sediment and clots in the urinary tube.       Current Outpatient Medications   Medication Sig Dispense Refill     acetaminophen (TYLENOL) 325 MG tablet Take 650 mg by mouth every 6 hours as needed for mild pain       Calamine external lotion Apply topically 2 times daily       furosemide (LASIX) 20 MG tablet Take 20 mg by mouth daily       magnesium hydroxide (MILK OF MAGNESIA) 400 MG/5ML suspension Take 30 mLs by mouth daily as needed for constipation or heartburn       melatonin 3 MG tablet Take 3 mg by mouth nightly as needed for sleep       Multiple Vitamin (ONE-A-DAY MENS PO) Take 1 tablet by mouth daily  "      polyethylene glycol (MIRALAX) 17 g packet Take 1 packet by mouth daily       QUEtiapine (SEROQUEL) 25 MG tablet Take 12.5 mg by mouth 2 times daily as needed       senna-docusate (SENOKOT-S/PERICOLACE) 8.6-50 MG tablet Take 1 tablet by mouth 2 times daily       tamsulosin (FLOMAX) 0.4 MG capsule Take 0.4 mg by mouth At Bedtime       vitamin C (ASCORBIC ACID) 250 MG tablet Take 500 mg by mouth daily       Allergies   Allergen Reactions     Amlodipine      Hydrochlorothiazide Unknown     Penicillins Other (See Comments)     Pt does not know.     Immunization History   Administered Date(s) Administered     COVID-19,PF,Moderna 02/04/2021, 03/04/2021, 11/24/2021     Flu, Unspecified 02/04/2008, 10/13/2009, 04/05/2011, 01/01/2018, 10/14/2019, 09/23/2021     Influenza (H1N1) 04/01/2010     Influenza (High Dose) 3 valent vaccine 01/17/2018, 11/02/2018     Influenza (IIV3) PF 01/01/2018     Influenza Vaccine IM > 6 months Valent IIV4 (Alfuria,Fluzone) 09/23/2021     Influenza Vaccine Im 4yrs+ 4 Valent CCIIV4 10/14/2019     Influenza, Quad, High Dose, Pf, 65yr+ (Fluzone HD) 09/22/2020     Mantoux Tuberculin Skin Test 02/11/2022     Pneumo Conj 13-V (2010&after) 05/10/2016     Pneumococcal, Unspecified 02/04/2008     Td (Adult), Adsorbed 07/18/1997, 02/08/2022     Tdap (Adacel,Boostrix) 05/04/2015     Tdap (Adult) Unspecified Formulation 07/01/2001, 04/05/2011     Zoster vaccine recombinant adjuvanted (SHINGRIX) 09/22/2020, 04/27/2021     Zoster vaccine, live 04/30/2013       Medications list and allergies in the facility chart have been reviewed.  Please see facility EMR for most up to date list.       Review of Systems   Difficult to obtain due to dementia      Physical Exam   Vital signs:/61   Pulse 89   Temp 97.8  F (36.6  C)   Resp 16   Ht 1.575 m (5' 2\")   Wt 62.6 kg (138 lb)   SpO2 92%   BMI 25.24 kg/m     GENERAL APPEARANCE: frail, elderly male, in no acute distress.  HEENT: normocephalic, " atraumatic   sclerae anicteric, conjunctivae clear and moist, EOM intact  LUNGS: upper LS CTA, difficult to fully assess LL due to does not deep inspire, respiratory effort normal.  CARD: RRR, S1, S2, without murmurs, gallops, rubs  ABD: Soft, nondistended and nontender with normal bowel sounds. Nash catheter brown cloudy urine with clots and sediment.   MSK: slumped in gerichair, Muscle strength and tone were equal bilaterally. Weakness of LE however does have good ROM   EXTREMITIES: No cyanosis, clubbing or edema.  NEURO: Alert with cognitive impairment. Face is symmetric.  SKIN: dark dry areas on fingers bilateral hands  PSYCH: calm      Labs:    Lab Results   Component Value Date    WBC 9.2 02/17/2022    WBC 10.4 04/30/2013     Lab Results   Component Value Date    RBC 4.27 02/17/2022    RBC 4.76 04/30/2013     Lab Results   Component Value Date    HGB 13.0 02/17/2022    HGB 13.6 04/30/2013     Lab Results   Component Value Date    HCT 38.8 02/17/2022    HCT 40.8 04/30/2013     Lab Results   Component Value Date    MCV 91 02/17/2022    MCV 85.7 04/30/2013     Lab Results   Component Value Date    MCH 30.4 02/17/2022    MCH 28.6 04/30/2013     Lab Results   Component Value Date    MCHC 33.5 02/17/2022    MCHC 33.3 04/30/2013     Lab Results   Component Value Date    RDW 14.0 02/17/2022    RDW 13.6 04/30/2013     Lab Results   Component Value Date     02/17/2022     04/30/2013     03/06/2013     Last Comprehensive Metabolic Panel:  Sodium   Date Value Ref Range Status   04/30/2013 139 mmol/L Final     Potassium   Date Value Ref Range Status   04/30/2013 4.3 mmol/L Final     Chloride   Date Value Ref Range Status   04/30/2013 104 mmol/L Final     Carbon Dioxide   Date Value Ref Range Status   03/06/2013 25 20 - 32 mmol/L Final     Anion Gap   Date Value Ref Range Status   03/06/2013 10 6 - 17 mmol/L Final     Glucose   Date Value Ref Range Status   04/30/2013 95 60 - 99 mg/dL Final     Urea  Nitrogen   Date Value Ref Range Status   04/30/2013 17 mg/dL Final     Creatinine   Date Value Ref Range Status   04/30/2013 1.0 mg/dL Final     GFR Estimate   Date Value Ref Range Status   04/30/2013 >60 ml/min/1.73m2 Final     Calcium   Date Value Ref Range Status   04/30/2013 9.4 mg/dL Final         Assessment/plan:   Hypoxia  New onset, rule out pneumonia due to history of recent pneumonia, chest xray. Check CBC and BMP    Obstructive uropathy  Will need to be chronic catheterization.  Question if UTI, will wait for labs as catheter is difficult for reinsertation.  Follow up with urology    Dementia with behavioral disturbance, unspecified dementia type (H)  Poor rehab prognosis and would recommend hospice consult if family agrees due to ongoing acute illness.  I attempted to call brother to have goals of care conversation however no answer.     Falls frequently  Monitor closely, needs memory care    Declining functional status  Hospice consult    Gross hematuria  Check CBC    Frostbite, sequela  Was seen at burn clinic and is planning for amputation of tip of 1 digit.  Wound care orders are written.  I would question if his goals of care align with amputation.  CM will call family.     Anemia, unspecified type  hgb good at 13.0 last week.     Pneumonia:  Resolved and abx are completed.    Primary hypertension  Stable on no medications        Electronically signed by: Kenyetta Menendez NP

## 2022-03-01 NOTE — LETTER
3/1/2022        RE: nAjel Thomas  [id number] [address] [address]        Code Status:  FULL CODE  Visit Type: RECHECK     Facility:   [facility] ([facility]) [[id number]]        History of Present Illness:   Hospital Admission Date: 2/8/2022 Hospital Discharge Date: 2/11/2022  Hospital Admission Date: 2/13/2022 Hospital Discharge Date: 2/16/2022      Anjel Thomas is a 86 year old male with a past medical history for HTN, senile nuclear sclerosis, BPH and dementia.  He was hospitalized after being found outside wandering.  He was found to have frostbite to bilateral hands and was treated by the burn specialist at [facility].  He was transferred to U.  At the TCU, he had multiple falls and was sent back to the hospital due to unable to keep him safe.  At the hospital, he was found to have pneumonia and was treated with IVF and abx.  He had obstructive uropathy and walker catheter was placed.  He developed hematuria and hgb was stable in 12s.  UA was negative for UTI.  He was transferred back to LTC.     Today, nursing requests a visit due to new onset hypoxia and now on 2L of O2.  Nursing reports that last night O2 sats was below 90%.  Upon exam he is more weak appearing and slumped over more in his anthony-chair.  LS are difficult to assess due to does not follow commands to deep inspire.  Catheter had to be reinserted last week at the ER due to obstruction and retention.  Has lots of sediment and clots in the urinary tube.       Current Outpatient Medications   Medication Sig Dispense Refill     acetaminophen (TYLENOL) 325 MG tablet Take 650 mg by mouth every 6 hours as needed for mild pain       Calamine external lotion Apply topically 2 times daily       furosemide (LASIX) 20 MG tablet Take 20 mg by mouth daily       magnesium hydroxide (MILK OF MAGNESIA) 400 MG/5ML suspension Take 30 mLs by mouth daily as needed for constipation or heartburn       melatonin 3 MG tablet Take 3 mg by mouth nightly as needed  "for sleep       Multiple Vitamin (ONE-A-DAY MENS PO) Take 1 tablet by mouth daily       polyethylene glycol (MIRALAX) 17 g packet Take 1 packet by mouth daily       QUEtiapine (SEROQUEL) 25 MG tablet Take 12.5 mg by mouth 2 times daily as needed       senna-docusate (SENOKOT-S/PERICOLACE) 8.6-50 MG tablet Take 1 tablet by mouth 2 times daily       tamsulosin (FLOMAX) 0.4 MG capsule Take 0.4 mg by mouth At Bedtime       vitamin C (ASCORBIC ACID) 250 MG tablet Take 500 mg by mouth daily       Allergies   Allergen Reactions     Amlodipine      Hydrochlorothiazide Unknown     Penicillins Other (See Comments)     Pt does not know.     Immunization History   Administered Date(s) Administered     COVID-19,PF,Moderna 02/04/2021, 03/04/2021, 11/24/2021     Flu, Unspecified 02/04/2008, 10/13/2009, 04/05/2011, 01/01/2018, 10/14/2019, 09/23/2021     Influenza (H1N1) 04/01/2010     Influenza (High Dose) 3 valent vaccine 01/17/2018, 11/02/2018     Influenza (IIV3) PF 01/01/2018     Influenza Vaccine IM > 6 months Valent IIV4 (Alfuria,Fluzone) 09/23/2021     Influenza Vaccine Im 4yrs+ 4 Valent CCIIV4 10/14/2019     Influenza, Quad, High Dose, Pf, 65yr+ (Fluzone HD) 09/22/2020     Mantoux Tuberculin Skin Test 02/11/2022     Pneumo Conj 13-V (2010&after) 05/10/2016     Pneumococcal, Unspecified 02/04/2008     Td (Adult), Adsorbed 07/18/1997, 02/08/2022     Tdap (Adacel,Boostrix) 05/04/2015     Tdap (Adult) Unspecified Formulation 07/01/2001, 04/05/2011     Zoster vaccine recombinant adjuvanted (SHINGRIX) 09/22/2020, 04/27/2021     Zoster vaccine, live 04/30/2013       Medications list and allergies in the facility chart have been reviewed.  Please see facility EMR for most up to date list.       Review of Systems   Difficult to obtain due to dementia      Physical Exam   Vital signs:/61   Pulse 89   Temp 97.8  F (36.6  C)   Resp 16   Ht 1.575 m (5' 2\")   Wt 62.6 kg (138 lb)   SpO2 92%   BMI 25.24 kg/m     GENERAL " APPEARANCE: frail, elderly male, in no acute distress.  HEENT: normocephalic, atraumatic   sclerae anicteric, conjunctivae clear and moist, EOM intact  LUNGS: upper LS CTA, difficult to fully assess LL due to does not deep inspire, respiratory effort normal.  CARD: RRR, S1, S2, without murmurs, gallops, rubs  ABD: Soft, nondistended and nontender with normal bowel sounds. Nash catheter brown cloudy urine with clots and sediment.   MSK: slumped in gerichair, Muscle strength and tone were equal bilaterally. Weakness of LE however does have good ROM   EXTREMITIES: No cyanosis, clubbing or edema.  NEURO: Alert with cognitive impairment. Face is symmetric.  SKIN: dark dry areas on fingers bilateral hands  PSYCH: calm      Labs:    Lab Results   Component Value Date    WBC 9.2 02/17/2022    WBC 10.4 04/30/2013     Lab Results   Component Value Date    RBC 4.27 02/17/2022    RBC 4.76 04/30/2013     Lab Results   Component Value Date    HGB 13.0 02/17/2022    HGB 13.6 04/30/2013     Lab Results   Component Value Date    HCT 38.8 02/17/2022    HCT 40.8 04/30/2013     Lab Results   Component Value Date    MCV 91 02/17/2022    MCV 85.7 04/30/2013     Lab Results   Component Value Date    MCH 30.4 02/17/2022    MCH 28.6 04/30/2013     Lab Results   Component Value Date    MCHC 33.5 02/17/2022    MCHC 33.3 04/30/2013     Lab Results   Component Value Date    RDW 14.0 02/17/2022    RDW 13.6 04/30/2013     Lab Results   Component Value Date     02/17/2022     04/30/2013     03/06/2013     Last Comprehensive Metabolic Panel:  Sodium   Date Value Ref Range Status   04/30/2013 139 mmol/L Final     Potassium   Date Value Ref Range Status   04/30/2013 4.3 mmol/L Final     Chloride   Date Value Ref Range Status   04/30/2013 104 mmol/L Final     Carbon Dioxide   Date Value Ref Range Status   03/06/2013 25 20 - 32 mmol/L Final     Anion Gap   Date Value Ref Range Status   03/06/2013 10 6 - 17 mmol/L Final     Glucose    Date Value Ref Range Status   04/30/2013 95 60 - 99 mg/dL Final     Urea Nitrogen   Date Value Ref Range Status   04/30/2013 17 mg/dL Final     Creatinine   Date Value Ref Range Status   04/30/2013 1.0 mg/dL Final     GFR Estimate   Date Value Ref Range Status   04/30/2013 >60 ml/min/1.73m2 Final     Calcium   Date Value Ref Range Status   04/30/2013 9.4 mg/dL Final         Assessment/plan:   Hypoxia  New onset, rule out pneumonia due to history of recent pneumonia, chest xray. Check CBC and BMP    Obstructive uropathy  Will need to be chronic catheterization.  Question if UTI, will wait for labs as catheter is difficult for reinsertation.  Follow up with urology    Dementia with behavioral disturbance, unspecified dementia type (H)  Poor rehab prognosis and would recommend hospice consult if family agrees due to ongoing acute illness.  I attempted to call brother to have goals of care conversation however no answer.     Falls frequently  Monitor closely, needs memory care    Declining functional status  Hospice consult    Gross hematuria  Check CBC    Frostbite, sequela  Was seen at burn clinic and is planning for amputation of tip of 1 digit.  Wound care orders are written.  I would question if his goals of care align with amputation.  CM will call family.     Anemia, unspecified type  hgb good at 13.0 last week.     Pneumonia:  Resolved and abx are completed.    Primary hypertension  Stable on no medications        Electronically signed by: Kenyetta Menendez NP          Sincerely,        Kenyetta Menendez NP

## 2022-03-02 NOTE — TELEPHONE ENCOUNTER
[facility] Triage Nurse Telephone Encounter    Provider: ALEXANDRIA Barriga  Facility: [facility] Facility Type:  TCU    Caller: Jaclyn  Call Back Number: [phone]    Allergies:    Allergies   Allergen Reactions     Amlodipine      Hydrochlorothiazide Unknown     Penicillins Other (See Comments)     Pt does not know.        Reason for call: Nurse is calling to report Heme 2 and BMP results.  Notable meds:  Vitamin C 500mg daily, Lasix 20mg daily, Flomax 0.4mg Q HS.      Verbal Order/Direction given by Provider: No new orders.      Provider giving Order:  ALEXANDRIA Barriga    Verbal Order given to: Jaclyn Lozada RN

## 2022-03-17 NOTE — TELEPHONE ENCOUNTER
Triage Home Care/Hospice Order Request    Provider: ALEXANDRIA Barriga  Facility: [facility]) Facility Type:  TCU    Agency: [facility] Type: Hospice  Caller: Laura  Call Back Number: [phone]      Order Request: Notifying PCP that medication changes have been made by hospice MD as follows:    Discontinue Ascorbic Acid, Multi-vitamins, Flomax    Schedule Seroquel 12.5mg PO BID (14 day PRN trial was given daily and effective for agitation/restlessness at )    Verbal orders approved and given to Laura; PCP notified of med changes made.    Provider giving order: ALEXANDRIA Barriga RN

## 2022-04-20 NOTE — PROGRESS NOTES
[facility] [place] GERIATRIC SERVICES       Patient Anjel Thomas  MRN: [id number]        Reason for Visit     Chief Complaint   Patient presents with     Nursing Home Regulatory   follow-up low BP /falls    Code Status     CPR/Full code     Assessment     Multiple falls with gait instabilty  Advanced dementia  Failure to thrive  Urinary retention requiring Nash catheter  Pulmonary hypertension  Hypotension  Recent history of frostbite of the right hand and left lower extremity  Generalized weakness    Plan     Pt is a resident of long-term care.  He remains wheelchair-bound; high fall risk and has switched to hospice cares in light of his overall decline with profound dementia  Speech has become nonsensical and he is no longer following commands or answering appropriately  Right hand still has some healing frostbite lesions on his fourth and fifth digits but does on his left hand and feet have resolved.  Surgery recommended partial amputation of the digits but family is debating as he is on hospice cares  Recently due to decline he has been taken off his vitamins and Flomax.  Seroquel reduced to 12.5 mg twice daily by psychiatric  Continue with supportive cares        History     Patient is a very pleasant 86 year old male who is a resident of long-term care on hospice support  Patient was initially admitted after he was found wandering and out in the snow with hypotension.  He was admitted noted to have frostbite which are being managed conservatively.  Seen by plastic surgery recently and partial amputation of the digits recommended for his right hand  He unfortunately is confused and unable to tell me anything.  Fingers are still black however no secondary infection noted  He is a very high fall risk and has been moved to a Broda chair  He denies any pain.  Blood pressures are low he is no longer on lisinopril.  Saw urology for obstructive uropathy and had a voiding trial.  Continues with his Nash catheter      Past  Medical & Surgical History     PAST MEDICAL HISTORY: Dementia  Osteoarthritis hypertension  PAST SURGICAL HISTORY:   has a past surgical history that includes Release carpal tunnel (5/10/2013).      Past Social History     Reviewed,  reports that he has never smoked. He has never used smokeless tobacco. He reports that he does not drink alcohol and does not use drugs.    Family History     Reviewed, and family history includes Arthritis in his mother; Cerebrovascular Disease in his father; Respiratory in his brother.    Medication List   Post Discharge Medication Reconciliation Status: Post Discharge Medication Reconciliation Status: discharge medications reconciled, continue medications without change.  Current Outpatient Medications   Medication     acetaminophen (TYLENOL) 325 MG tablet     Calamine external lotion     furosemide (LASIX) 20 MG tablet     magnesium hydroxide (MILK OF MAGNESIA) 400 MG/5ML suspension     melatonin 3 MG tablet     polyethylene glycol (MIRALAX) 17 g packet     QUEtiapine (SEROQUEL) 25 MG tablet     senna-docusate (SENOKOT-S/PERICOLACE) 8.6-50 MG tablet     No current facility-administered medications for this visit.    on doxy and cefopodoxime for 3d      Allergies     Allergies   Allergen Reactions     Amlodipine      Hydrochlorothiazide Unknown     Penicillins Other (See Comments)     Pt does not know.       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 nonCooperative in conversation.   HEENT: pupils are equal, round and reactive. Oral mucosa is moist and intact.  RESP:Chest symmetric. Regular respiratory rate. No stridor.  cvs s1s2  ABD: Nondistended, soft.  EXTREMITIES: No lower extremity edema.   NEURO: non focal. Alert and oriented x self only  PSYCH: within normal limits. No depression or anxiety.  No recall of recent events speech is nonsensical  SKIN: warm dry intact black eschar noted on the tips of his fourth and fifth finger with some segregation noted where his gangrene is  from his fingertips.        Electronically signed by    Asha Ford MD                       "

## 2022-04-20 NOTE — LETTER
4/20/2022        RE: Anjel Thomas  [address] [address] [address]        [facility] GERIATRIC SERVICES       Patient Anjel Thomas  MRN: [id number]        Reason for Visit     Chief Complaint   Patient presents with     Nursing Home Regulatory   follow-up low BP /falls    Code Status     CPR/Full code     Assessment     Multiple falls with gait instabilty  Advanced dementia  Failure to thrive  Urinary retention requiring Nash catheter  Pulmonary hypertension  Hypotension  Recent history of frostbite of the right hand and left lower extremity  Generalized weakness    Plan     Pt is a resident of long-term care.  He remains wheelchair-bound; high fall risk and has switched to hospice cares in light of his overall decline with profound dementia  Speech has become nonsensical and he is no longer following commands or answering appropriately  Right hand still has some healing frostbite lesions on his fourth and fifth digits but does on his left hand and feet have resolved.  Surgery recommended partial amputation of the digits but family is debating as he is on hospice cares  Recently due to decline he has been taken off his vitamins and Flomax.  Seroquel reduced to 12.5 mg twice daily by psychiatric  Continue with supportive cares        History     Patient is a very pleasant 86 year old male who is a resident of long-term care on hospice support  Patient was initially admitted after he was found wandering and out in the snow with hypotension.  He was admitted noted to have frostbite which are being managed conservatively.  Seen by plastic surgery recently and partial amputation of the digits recommended for his right hand  He unfortunately is confused and unable to tell me anything.  Fingers are still black however no secondary infection noted  He is a very high fall risk and has been moved to a Broda chair  He denies any pain.  Blood pressures are low he is no longer on lisinopril.  Saw urology for obstructive  uropathy and had a voiding trial.  Continues with his Nash catheter      Past Medical & Surgical History     PAST MEDICAL HISTORY: Dementia  Osteoarthritis hypertension  PAST SURGICAL HISTORY:   has a past surgical history that includes Release carpal tunnel (5/10/2013).      Past Social History     Reviewed,  reports that he has never smoked. He has never used smokeless tobacco. He reports that he does not drink alcohol and does not use drugs.    Family History     Reviewed, and family history includes Arthritis in his mother; Cerebrovascular Disease in his father; Respiratory in his brother.    Medication List   Post Discharge Medication Reconciliation Status: Post Discharge Medication Reconciliation Status: discharge medications reconciled, continue medications without change.  Current Outpatient Medications   Medication     acetaminophen (TYLENOL) 325 MG tablet     Calamine external lotion     furosemide (LASIX) 20 MG tablet     magnesium hydroxide (MILK OF MAGNESIA) 400 MG/5ML suspension     melatonin 3 MG tablet     polyethylene glycol (MIRALAX) 17 g packet     QUEtiapine (SEROQUEL) 25 MG tablet     senna-docusate (SENOKOT-S/PERICOLACE) 8.6-50 MG tablet     No current facility-administered medications for this visit.    on doxy and cefopodoxime for 3d      Allergies     Allergies   Allergen Reactions     Amlodipine      Hydrochlorothiazide Unknown     Penicillins Other (See Comments)     Pt does not know.       Review of Systems   A comprehensive review of 14 systems could not be done as patient has profound dementia  He does not answer questions appropriately and does not follow commands  Does not ambulate and is in a Broda chair because of his high fall risk  Continues with his Nash catheter  Blood pressure stable but no longer on any antihypertensives  Continues to have significant behaviors with sundowning due to which he is on Seroquel but at a lower dose now  Dry gangrene on right hand is  "healing      Physical Exam   /64   Pulse 80   Temp 97.5  F (36.4  C)   Resp 20   Ht 1.575 m (5' 2\")   Wt 63.5 kg (140 lb)   SpO2 96%   BMI 25.61 kg/m       GENERAL: no acute distress. nonCooperative in conversation.   HEENT: pupils are equal, round and reactive. Oral mucosa is moist and intact.  RESP:Chest symmetric. Regular respiratory rate. No stridor.  cvs s1s2  ABD: Nondistended, soft.  EXTREMITIES: No lower extremity edema.   NEURO: non focal. Alert and oriented x self only  PSYCH: within normal limits. No depression or anxiety.  No recall of recent events speech is nonsensical  SKIN: warm dry intact black eschar noted on the tips of his fourth and fifth finger with some segregation noted where his gangrene is  from his fingertips.        Electronically signed by    Asha Ford MD                             Sincerely,        JCARLOS Hyde      "

## 2022-05-04 NOTE — LETTER
"    5/4/2022        RE: Anjel Thomas  [address] [address] [facility] [address]        Code Status:  FULL CODE  Visit Type: Nursing Home Acute     Facility:   [facility] () [[id number]]         History of Present Illness: Anjel Thomas is a 86 year old male with a past medical history for HTN, senile nuclear sclerosis, BPH and dementia.  On hospice care.      Today, I see him slumped to the left in his anthony-chair.  When I adjust him to sitting he is alert but not responsive.  Pupils are pinpoint.  Nursing reports they were not able to get him to eat today.  He does not answer any of my questions or track me with my eyes.  After my visit, I see him sleeping in his chair appearing comfortable.  This past week Hospice had increased his Morphine to scheduled every 6 hours.      Current Outpatient Medications   Medication Sig Dispense Refill     acetaminophen (TYLENOL) 325 MG tablet Take 650 mg by mouth every 6 hours as needed for mild pain       Calamine external lotion Apply topically 2 times daily       furosemide (LASIX) 20 MG tablet Take 20 mg by mouth daily       hyoscyamine (LEVSIN) 0.125 MG tablet Take 0.125 mg by mouth every 4 hours as needed for cramping       magnesium hydroxide (MILK OF MAGNESIA) 400 MG/5ML suspension Take 30 mLs by mouth daily as needed for constipation or heartburn       melatonin 3 MG tablet Take 3 mg by mouth nightly as needed for sleep       polyethylene glycol (MIRALAX) 17 g packet Take 1 packet by mouth daily       QUEtiapine (SEROQUEL) 25 MG tablet Take 12.5 mg by mouth 2 times daily       senna-docusate (SENOKOT-S/PERICOLACE) 8.6-50 MG tablet Take 1 tablet by mouth 2 times daily         Review of Systems   Unable to obtain due to unresponsive    Physical Exam  Vital signs:/66   Pulse 75   Temp 97.2  F (36.2  C)   Resp 16   Ht 1.575 m (5' 2\")   Wt 53.1 kg (117 lb)   SpO2 92%   BMI 21.40 kg/m     GENERAL APPEARANCE: Thin, frail, elderly male, in no acute " distress.  HEENT: normocephalic, atraumatic  Pinpoint pupils, sclerae anicteric, conjunctivae clear and moist,   LUNGS: Lung sounds CTA, no adventitious sounds, respiratory effort normal.  CARD: RRR, S1, S2, without murmurs, gallops, rubs   EXTREMITIES: No cyanosis, clubbing or edema.  NEURO: Alert , not responsive.  Face is symmetric.  SKIN: Inspection of the skin reveals no rashes, ulcerations or petechiae.    Labs:  Patient is on hospice/palliative care and labs are not recommended      Assessment/Plan:  Medication dose increased  Medication side effects  Dementia with behavioral disturbance, unspecified dementia type (H)  Hospice care patient  Likely overmedicated.  I decrease morphine to previous dose of BID and prn.  Monitor mental status.            Electronically signed by:Kenyetta Menendez NP                  Sincerely,        Kenyetta Menendez NP

## 2022-05-04 NOTE — PROGRESS NOTES
"Code Status:  FULL CODE  Visit Type: Nursing Home Acute     Facility:   [facility] () [[id number]]         History of Present Illness: Anjel Thomas is a 86 year old male with a past medical history for HTN, senile nuclear sclerosis, BPH and dementia.  On hospice care.      Today, I see him slumped to the left in his anthony-chair.  When I adjust him to sitting he is alert but not responsive.  Pupils are pinpoint.  Nursing reports they were not able to get him to eat today.  He does not answer any of my questions or track me with my eyes.  After my visit, I see him sleeping in his chair appearing comfortable.  This past week Hospice had increased his Morphine to scheduled every 6 hours.      Current Outpatient Medications   Medication Sig Dispense Refill     acetaminophen (TYLENOL) 325 MG tablet Take 650 mg by mouth every 6 hours as needed for mild pain       Calamine external lotion Apply topically 2 times daily       furosemide (LASIX) 20 MG tablet Take 20 mg by mouth daily       hyoscyamine (LEVSIN) 0.125 MG tablet Take 0.125 mg by mouth every 4 hours as needed for cramping       magnesium hydroxide (MILK OF MAGNESIA) 400 MG/5ML suspension Take 30 mLs by mouth daily as needed for constipation or heartburn       melatonin 3 MG tablet Take 3 mg by mouth nightly as needed for sleep       polyethylene glycol (MIRALAX) 17 g packet Take 1 packet by mouth daily       QUEtiapine (SEROQUEL) 25 MG tablet Take 12.5 mg by mouth 2 times daily       senna-docusate (SENOKOT-S/PERICOLACE) 8.6-50 MG tablet Take 1 tablet by mouth 2 times daily         Review of Systems   Unable to obtain due to unresponsive    Physical Exam  Vital signs:/66   Pulse 75   Temp 97.2  F (36.2  C)   Resp 16   Ht 1.575 m (5' 2\")   Wt 53.1 kg (117 lb)   SpO2 92%   BMI 21.40 kg/m     GENERAL APPEARANCE: Thin, frail, elderly male, in no acute distress.  HEENT: normocephalic, atraumatic  Pinpoint pupils, sclerae anicteric, conjunctivae " clear and moist,   LUNGS: Lung sounds CTA, no adventitious sounds, respiratory effort normal.  CARD: RRR, S1, S2, without murmurs, gallops, rubs   EXTREMITIES: No cyanosis, clubbing or edema.  NEURO: Alert , not responsive.  Face is symmetric.  SKIN: Inspection of the skin reveals no rashes, ulcerations or petechiae.    Labs:  Patient is on hospice/palliative care and labs are not recommended      Assessment/Plan:  Medication dose increased  Medication side effects  Dementia with behavioral disturbance, unspecified dementia type (H)  Hospice care patient  Likely overmedicated.  I decrease morphine to previous dose of BID and prn.  Monitor mental status.            Electronically signed by:Kenyetta Menendez NP

## 2022-05-09 ENCOUNTER — DOCUMENTATION ONLY (OUTPATIENT)
Dept: GERIATRICS | Facility: CLINIC | Age: 87
End: 2022-05-09
Payer: COMMERCIAL